# Patient Record
Sex: FEMALE | Race: ASIAN | NOT HISPANIC OR LATINO | URBAN - METROPOLITAN AREA
[De-identification: names, ages, dates, MRNs, and addresses within clinical notes are randomized per-mention and may not be internally consistent; named-entity substitution may affect disease eponyms.]

---

## 2019-06-01 ENCOUNTER — OUTPATIENT (OUTPATIENT)
Dept: OUTPATIENT SERVICES | Facility: HOSPITAL | Age: 69
LOS: 1 days | End: 2019-06-01
Payer: MEDICARE

## 2019-06-01 DIAGNOSIS — Z98.89 OTHER SPECIFIED POSTPROCEDURAL STATES: Chronic | ICD-10-CM

## 2019-06-01 PROCEDURE — G9001: CPT

## 2019-06-11 RX ORDER — CIPROFLOXACIN LACTATE 400MG/40ML
1 VIAL (ML) INTRAVENOUS
Qty: 0 | Refills: 0 | DISCHARGE
Start: 2019-06-11 | End: 2019-06-24

## 2019-06-11 RX ORDER — FLUCONAZOLE 150 MG/1
1 TABLET ORAL
Qty: 0 | Refills: 0 | DISCHARGE
Start: 2019-06-11 | End: 2019-06-24

## 2019-06-11 RX ORDER — LINEZOLID 600 MG/300ML
1 INJECTION, SOLUTION INTRAVENOUS
Qty: 0 | Refills: 0 | DISCHARGE
Start: 2019-06-11 | End: 2019-06-24

## 2019-06-15 ENCOUNTER — INPATIENT (INPATIENT)
Facility: HOSPITAL | Age: 69
LOS: 2 days | Discharge: PSYCHIATRIC OR SUBSTANCE USE FACILITY | End: 2019-06-18
Attending: INTERNAL MEDICINE | Admitting: INTERNAL MEDICINE
Payer: MEDICARE

## 2019-06-15 VITALS
TEMPERATURE: 97 F | RESPIRATION RATE: 18 BRPM | SYSTOLIC BLOOD PRESSURE: 170 MMHG | OXYGEN SATURATION: 100 % | DIASTOLIC BLOOD PRESSURE: 90 MMHG | HEART RATE: 98 BPM

## 2019-06-15 DIAGNOSIS — I10 ESSENTIAL (PRIMARY) HYPERTENSION: ICD-10-CM

## 2019-06-15 DIAGNOSIS — Z29.9 ENCOUNTER FOR PROPHYLACTIC MEASURES, UNSPECIFIED: ICD-10-CM

## 2019-06-15 DIAGNOSIS — M86.8X7 OTHER OSTEOMYELITIS, ANKLE AND FOOT: ICD-10-CM

## 2019-06-15 DIAGNOSIS — E16.2 HYPOGLYCEMIA, UNSPECIFIED: ICD-10-CM

## 2019-06-15 DIAGNOSIS — G93.41 METABOLIC ENCEPHALOPATHY: ICD-10-CM

## 2019-06-15 DIAGNOSIS — F20.9 SCHIZOPHRENIA, UNSPECIFIED: ICD-10-CM

## 2019-06-15 DIAGNOSIS — Z98.89 OTHER SPECIFIED POSTPROCEDURAL STATES: Chronic | ICD-10-CM

## 2019-06-15 DIAGNOSIS — D64.9 ANEMIA, UNSPECIFIED: ICD-10-CM

## 2019-06-15 LAB
ALBUMIN SERPL ELPH-MCNC: 3.5 G/DL — SIGNIFICANT CHANGE UP (ref 3.3–5)
ALP SERPL-CCNC: 86 U/L — SIGNIFICANT CHANGE UP (ref 40–120)
ALT FLD-CCNC: 9 U/L — SIGNIFICANT CHANGE UP (ref 4–33)
ANION GAP SERPL CALC-SCNC: 14 MMO/L — SIGNIFICANT CHANGE UP (ref 7–14)
APTT BLD: 37.6 SEC — HIGH (ref 27.5–36.3)
AST SERPL-CCNC: 17 U/L — SIGNIFICANT CHANGE UP (ref 4–32)
BASE EXCESS BLDV CALC-SCNC: 4.6 MMOL/L — SIGNIFICANT CHANGE UP
BASOPHILS # BLD AUTO: 0.02 K/UL — SIGNIFICANT CHANGE UP (ref 0–0.2)
BASOPHILS NFR BLD AUTO: 0.2 % — SIGNIFICANT CHANGE UP (ref 0–2)
BILIRUB SERPL-MCNC: 0.3 MG/DL — SIGNIFICANT CHANGE UP (ref 0.2–1.2)
BLOOD GAS VENOUS - CREATININE: 0.57 MG/DL — SIGNIFICANT CHANGE UP (ref 0.5–1.3)
BLOOD GAS VENOUS - FIO2: 21 — SIGNIFICANT CHANGE UP
BUN SERPL-MCNC: 8 MG/DL — SIGNIFICANT CHANGE UP (ref 7–23)
CALCIUM SERPL-MCNC: 9.6 MG/DL — SIGNIFICANT CHANGE UP (ref 8.4–10.5)
CHLORIDE BLDV-SCNC: 102 MMOL/L — SIGNIFICANT CHANGE UP (ref 96–108)
CHLORIDE SERPL-SCNC: 97 MMOL/L — LOW (ref 98–107)
CO2 SERPL-SCNC: 27 MMOL/L — SIGNIFICANT CHANGE UP (ref 22–31)
CREAT SERPL-MCNC: 0.52 MG/DL — SIGNIFICANT CHANGE UP (ref 0.5–1.3)
EOSINOPHIL # BLD AUTO: 0 K/UL — SIGNIFICANT CHANGE UP (ref 0–0.5)
EOSINOPHIL NFR BLD AUTO: 0 % — SIGNIFICANT CHANGE UP (ref 0–6)
GAS PNL BLDV: 139 MMOL/L — SIGNIFICANT CHANGE UP (ref 136–146)
GLUCOSE BLDV-MCNC: 80 MG/DL — SIGNIFICANT CHANGE UP (ref 70–99)
GLUCOSE SERPL-MCNC: 80 MG/DL — SIGNIFICANT CHANGE UP (ref 70–99)
HCO3 BLDV-SCNC: 27 MMOL/L — SIGNIFICANT CHANGE UP (ref 20–27)
HCT VFR BLD CALC: 30.6 % — LOW (ref 34.5–45)
HCT VFR BLDV CALC: 29.6 % — LOW (ref 34.5–45)
HGB BLD-MCNC: 9.1 G/DL — LOW (ref 11.5–15.5)
HGB BLDV-MCNC: 9.6 G/DL — LOW (ref 11.5–15.5)
IMM GRANULOCYTES NFR BLD AUTO: 0.2 % — SIGNIFICANT CHANGE UP (ref 0–1.5)
INR BLD: 1.27 — HIGH (ref 0.88–1.17)
LACTATE BLDV-MCNC: 1.4 MMOL/L — SIGNIFICANT CHANGE UP (ref 0.5–2)
LYMPHOCYTES # BLD AUTO: 1.06 K/UL — SIGNIFICANT CHANGE UP (ref 1–3.3)
LYMPHOCYTES # BLD AUTO: 11.6 % — LOW (ref 13–44)
MCHC RBC-ENTMCNC: 26.7 PG — LOW (ref 27–34)
MCHC RBC-ENTMCNC: 29.7 % — LOW (ref 32–36)
MCV RBC AUTO: 89.7 FL — SIGNIFICANT CHANGE UP (ref 80–100)
MONOCYTES # BLD AUTO: 0.24 K/UL — SIGNIFICANT CHANGE UP (ref 0–0.9)
MONOCYTES NFR BLD AUTO: 2.6 % — SIGNIFICANT CHANGE UP (ref 2–14)
NEUTROPHILS # BLD AUTO: 7.83 K/UL — HIGH (ref 1.8–7.4)
NEUTROPHILS NFR BLD AUTO: 85.4 % — HIGH (ref 43–77)
NRBC # FLD: 0 K/UL — SIGNIFICANT CHANGE UP (ref 0–0)
PCO2 BLDV: 59 MMHG — HIGH (ref 41–51)
PH BLDV: 7.33 PH — SIGNIFICANT CHANGE UP (ref 7.32–7.43)
PLATELET # BLD AUTO: 594 K/UL — HIGH (ref 150–400)
PMV BLD: 8.5 FL — SIGNIFICANT CHANGE UP (ref 7–13)
PO2 BLDV: 19 MMHG — LOW (ref 35–40)
POTASSIUM BLDV-SCNC: 3.7 MMOL/L — SIGNIFICANT CHANGE UP (ref 3.4–4.5)
POTASSIUM SERPL-MCNC: 3.7 MMOL/L — SIGNIFICANT CHANGE UP (ref 3.5–5.3)
POTASSIUM SERPL-SCNC: 3.7 MMOL/L — SIGNIFICANT CHANGE UP (ref 3.5–5.3)
PROT SERPL-MCNC: 8.6 G/DL — HIGH (ref 6–8.3)
PROTHROM AB SERPL-ACNC: 14.6 SEC — HIGH (ref 9.8–13.1)
RBC # BLD: 3.41 M/UL — LOW (ref 3.8–5.2)
RBC # FLD: 19.1 % — HIGH (ref 10.3–14.5)
SAO2 % BLDV: 19.1 % — LOW (ref 60–85)
SODIUM SERPL-SCNC: 138 MMOL/L — SIGNIFICANT CHANGE UP (ref 135–145)
TSH SERPL-MCNC: 3.44 UIU/ML — SIGNIFICANT CHANGE UP (ref 0.27–4.2)
WBC # BLD: 9.17 K/UL — SIGNIFICANT CHANGE UP (ref 3.8–10.5)
WBC # FLD AUTO: 9.17 K/UL — SIGNIFICANT CHANGE UP (ref 3.8–10.5)

## 2019-06-15 PROCEDURE — 71045 X-RAY EXAM CHEST 1 VIEW: CPT | Mod: 26

## 2019-06-15 PROCEDURE — 70450 CT HEAD/BRAIN W/O DYE: CPT | Mod: 26

## 2019-06-15 PROCEDURE — 93010 ELECTROCARDIOGRAM REPORT: CPT

## 2019-06-15 PROCEDURE — 99223 1ST HOSP IP/OBS HIGH 75: CPT | Mod: GC

## 2019-06-15 RX ORDER — LINEZOLID 600 MG/300ML
600 INJECTION, SOLUTION INTRAVENOUS EVERY 12 HOURS
Refills: 0 | Status: DISCONTINUED | OUTPATIENT
Start: 2019-06-15 | End: 2019-06-18

## 2019-06-15 RX ORDER — ENOXAPARIN SODIUM 100 MG/ML
40 INJECTION SUBCUTANEOUS DAILY
Refills: 0 | Status: DISCONTINUED | OUTPATIENT
Start: 2019-06-15 | End: 2019-06-18

## 2019-06-15 RX ORDER — HALOPERIDOL DECANOATE 100 MG/ML
5 INJECTION INTRAMUSCULAR AT BEDTIME
Refills: 0 | Status: DISCONTINUED | OUTPATIENT
Start: 2019-06-15 | End: 2019-06-17

## 2019-06-15 RX ORDER — AMLODIPINE BESYLATE 2.5 MG/1
5 TABLET ORAL DAILY
Refills: 0 | Status: DISCONTINUED | OUTPATIENT
Start: 2019-06-15 | End: 2019-06-18

## 2019-06-15 RX ORDER — FLUCONAZOLE 150 MG/1
200 TABLET ORAL DAILY
Refills: 0 | Status: DISCONTINUED | OUTPATIENT
Start: 2019-06-15 | End: 2019-06-18

## 2019-06-15 RX ORDER — LINEZOLID 600 MG/300ML
600 INJECTION, SOLUTION INTRAVENOUS EVERY 12 HOURS
Refills: 0 | Status: DISCONTINUED | OUTPATIENT
Start: 2019-06-15 | End: 2019-06-15

## 2019-06-15 RX ORDER — METOPROLOL TARTRATE 50 MG
50 TABLET ORAL
Refills: 0 | Status: DISCONTINUED | OUTPATIENT
Start: 2019-06-15 | End: 2019-06-18

## 2019-06-15 RX ORDER — ASPIRIN/CALCIUM CARB/MAGNESIUM 324 MG
81 TABLET ORAL DAILY
Refills: 0 | Status: DISCONTINUED | OUTPATIENT
Start: 2019-06-15 | End: 2019-06-18

## 2019-06-15 RX ORDER — BENZTROPINE MESYLATE 1 MG
0.5 TABLET ORAL AT BEDTIME
Refills: 0 | Status: DISCONTINUED | OUTPATIENT
Start: 2019-06-15 | End: 2019-06-18

## 2019-06-15 RX ORDER — LISINOPRIL 2.5 MG/1
30 TABLET ORAL DAILY
Refills: 0 | Status: DISCONTINUED | OUTPATIENT
Start: 2019-06-15 | End: 2019-06-18

## 2019-06-15 RX ORDER — SODIUM CHLORIDE 9 MG/ML
1000 INJECTION, SOLUTION INTRAVENOUS
Refills: 0 | Status: DISCONTINUED | OUTPATIENT
Start: 2019-06-15 | End: 2019-06-16

## 2019-06-15 RX ORDER — FLUCONAZOLE 150 MG/1
200 TABLET ORAL DAILY
Refills: 0 | Status: DISCONTINUED | OUTPATIENT
Start: 2019-06-15 | End: 2019-06-15

## 2019-06-15 RX ORDER — DEXTROSE 50 % IN WATER 50 %
50 SYRINGE (ML) INTRAVENOUS ONCE
Refills: 0 | Status: COMPLETED | OUTPATIENT
Start: 2019-06-15 | End: 2019-06-15

## 2019-06-15 RX ADMIN — FLUCONAZOLE 200 MILLIGRAM(S): 150 TABLET ORAL at 23:47

## 2019-06-15 RX ADMIN — HALOPERIDOL DECANOATE 5 MILLIGRAM(S): 100 INJECTION INTRAMUSCULAR at 23:08

## 2019-06-15 RX ADMIN — Medication 0.5 MILLIGRAM(S): at 23:08

## 2019-06-15 RX ADMIN — SODIUM CHLORIDE 20 MILLILITER(S): 9 INJECTION, SOLUTION INTRAVENOUS at 16:37

## 2019-06-15 RX ADMIN — Medication 50 MILLILITER(S): at 16:57

## 2019-06-15 NOTE — H&P ADULT - PROBLEM SELECTOR PLAN 1
- 2/2 hypoglycemia  - likely 2/2 decreased PO intake, insulin administration  - previous episode in setting of sulfonylurea and osteomyelitis   - per daughter, no longer on sulfonylurea  - last episode likely in setting of infection, currently afebrile, no leukocytosis and no complaints of infectious sources, right foot stump healing  - c/w antibiotics per d/c summary  - c/w FS q2 and D10 drip given drop again in ED after amp from EMS  - c/w PO intake  - will need optimization of DM regimen prior to d/c, will need to clarify insulin administration with other daughter who gives to patient - decrease vs d/c insulin  - f/u A1C  - CTH with chronic changes, no acute pathology  - if recurrent hypoglycemic episodes despite glucose therapy will consider recurrent sulfonylurea intox and give octreotide

## 2019-06-15 NOTE — ED PROVIDER NOTE - ATTENDING CONTRIBUTION TO CARE
I, Jennifer Cabot, MD, have performed a history and physical exam of the patient and discussed their management with the resident. I reviewed the resident's note and agree with the documented findings and plan of care. My medical decision making and observations are found above.    Cabot: 69F with PMH of IDDM not on sulfonylureas, recent distal foot amp, HTN who comes in with AMS (overly happy, verbose, AAOx1 only) and hypoglycemia to 27.  Family denies F/C/N/V/D/urinary sx/cough/HA/erythema or discharge from the wound.  HDS, NAD, MMM, eyes clear, lungs CTAB, heart sounds normal, abd soft, NT, ND, no CVAT, LEs without edema, wwp, skin normal temperature and color, neuro: AAOx1, verbose, PERRLA, EOMIB, CN 2-12 intact, 5/5 strength, SILT.  Will give food, check basic labs, UA, cultures, CXR, head CT, reassess. I, Jennifer Cabot, MD, have performed a history and physical exam of the patient and discussed their management with the resident. I reviewed the resident's note and agree with the documented findings and plan of care. My medical decision making and observations are found above.    Cabot: 69F with PMH of IDDM not on sulfonylureas, recent distal foot amp, HTN who comes in with AMS (overly happy, verbose, AAOx1 only) and hypoglycemia to 27.  Family denies F/C/N/V/D/urinary sx/cough/HA/erythema or discharge from the wound.  HDS, NAD, MMM, eyes clear, lungs CTAB, heart sounds normal, abd soft, NT, ND, no CVAT, LEs without edema, R amputation site with sutures intact, no erythema, warmth, or discharge, wwp, skin normal temperature and color, neuro: AAOx1, verbose, PERRLA, EOMIB, CN 2-12 intact, 5/5 strength, SILT.  Will give food, check basic labs, UA, cultures, CXR, head CT, reassess. 5

## 2019-06-15 NOTE — H&P ADULT - ATTENDING COMMENTS
70 yo f currently on metformin and insulin, recently discontinued sulfonylurea, presenting with symptomatic hypoglycemia likely stemming from decreased insulin requirement/decreased po intake as pt diagnosed  with recent osteomyelitis in right foot with ulcer, and underwent transmetatarsal amputation. No current signs of sepsis, pt with normal renal function. c/w d10 until FS reflects resolution of hypoglycemic insult. c/w OM abx, wound care

## 2019-06-15 NOTE — H&P ADULT - NSICDXPASTMEDICALHX_GEN_ALL_CORE_FT
PAST MEDICAL HISTORY:  Diabetes     Hyperlipidemia     Hypertension PAST MEDICAL HISTORY:  Diabetes insulin dependent    Hypertension     Schizophrenia

## 2019-06-15 NOTE — ED ADULT TRIAGE NOTE - CHIEF COMPLAINT QUOTE
pt brought in by ems family states pt did not wake up so checked sugar and it was 24  called ems / on arrival pt was responsive to painful stimuli/  pt given 1 amp d50 fs by ems 257    fs at trige  86   pt has hx schizophrneia/ pt arrives a&o  c/o abd pain at this time/  pt had procedure done on foot about 1 week ago   iv in left arm by ems

## 2019-06-15 NOTE — H&P ADULT - PROBLEM SELECTOR PLAN 4
- unknown baseline  - last labs in system from 2015 with H/H 13.5/40.2  - TSH wnl, no s/s of bleeding  - will send basic anemia w/u - iron panel, folate, b12, retic, ferritin - unknown baseline, per d/c summary was noted at [facility] as well  - last labs in system from 2015 with H/H 13.5/40.2  - TSH wnl, no s/s of bleeding  - will send basic anemia w/u - iron panel, folate, b12, retic, ferritin

## 2019-06-15 NOTE — H&P ADULT - PROBLEM SELECTOR PLAN 7
- diet - regular  - dvt - lovenox - dx at [facility] last week s/p right transmetatarsal amputation  - c/w linezolid, fluconazole, levaquin - d/c with 14 day course to end 6/24  - hypoglycemia not a SE
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
## 2019-06-15 NOTE — H&P ADULT - NSHPPHYSICALEXAM_GEN_ALL_CORE
Vital Signs Last 24 Hrs  T(C): 36.7 (15 Negro 2019 16:47), Max: 36.7 (15 Negro 2019 16:47)  T(F): 98 (15 Negro 2019 16:47), Max: 98 (15 Negro 2019 16:47)  HR: 108 (15 Negro 2019 16:47) (98 - 108)  BP: 135/72 (15 Negro 2019 16:47) (103/76 - 170/90)  BP(mean): --  RR: 18 (15 Negro 2019 16:47) (16 - 18)  SpO2: 100% (15 Negro 2019 16:47) (100% - 100%) Vital Signs Last 24 Hrs  T(C): 36.7 (15 Negro 2019 16:47), Max: 36.7 (15 Negro 2019 16:47)  T(F): 98 (15 Negro 2019 16:47), Max: 98 (15 Negro 2019 16:47)  HR: 108 (15 Negro 2019 16:47) (98 - 108)  BP: 135/72 (15 Negro 2019 16:47) (103/76 - 170/90)  BP(mean): --  RR: 18 (15 Negro 2019 16:47) (16 - 18)  SpO2: 100% (15 Negro 2019 16:47) (100% - 100%)    GENERAL: NAD, well-developed  HEAD:  Atraumatic, Normocephalic  EYES: EOMI, PERRLA, conjunctiva and sclera clear  NECK: Supple, No JVD  CHEST/LUNG: Clear to auscultation bilaterally; No wheezes or rales  HEART: Regular rate and rhythm; No murmurs, rubs, or gallops  ABDOMEN: Soft, Nontender, Nondistended; Bowel sounds present  EXTREMITIES:  2+ Peripheral Pulses, No clubbing, cyanosis, or edema  PSYCH: AAOx3  NEUROLOGY: non-focal  SKIN: No rashes or lesions Vital Signs Last 24 Hrs  T(C): 36.7 (15 Negro 2019 16:47), Max: 36.7 (15 Negro 2019 16:47)  T(F): 98 (15 Negro 2019 16:47), Max: 98 (15 Negro 2019 16:47)  HR: 108 (15 Negro 2019 16:47) (98 - 108)  BP: 135/72 (15 Negro 2019 16:47) (103/76 - 170/90)  BP(mean): --  RR: 18 (15 Negro 2019 16:47) (16 - 18)  SpO2: 100% (15 Negro 2019 16:47) (100% - 100%)    GENERAL: NAD, sitting up in bed, consistently talking with tangents  HEAD:  Atraumatic, Normocephalic, poor dentition with missing teeth  EYES: EOMI, PERRLA, conjunctiva and sclera clear  NECK: Supple  CHEST/LUNG: Clear to auscultation bilaterally; No wheezes or rales  HEART: tachycardic, regular rhythm; No murmurs, rubs, or gallops  ABDOMEN: Soft, Nontender, Nondistended; Bowel sounds present  EXTREMITIES:  transmetatarsal amputation on right foot with stitching in place, dry skin, no active bleeding or pus  PSYCH: AAOx3, persistent talking as above however able to answer questions  NEUROLOGY: non-focal

## 2019-06-15 NOTE — H&P ADULT - HISTORY OF PRESENT ILLNESS
69F with DM2 c/b neuropathy with recent transmetatarsal amputation, HTN coming to the ED with AMS at home. 69F with DM2 c/b neuropathy with recent transmetatarsal amputation, schizophrenia, HTN coming to the ED with AMS at home. History largely obtained from daughter at bedside due to patient's underlying schizophrenia. Daughter states patient was in normal state of health yesterday, ate dinner and went to bed. She takes haldol at night per daughter which sometimes will make her more sleepy but usually is tolerated. In the AM she did not wake up at her usual time which family attributed to haldol at first. She continued to sleep and later in the morning family tried to wake her but she was unarousable so they checked her sugar which was 24. They called 911 and patient was given dextrose which resulted in drastic improvement in mental status. Patient was recently hospitalized in [facility] for hypoglycemia and found to have osteomyelitis in right foot with ulcer. Underwent transmetatarsal amputation on 6/6 and was d/c on linezolid, flagyl and levaquin. She was previously on a sulfonylurea which was d/albertina on admission. She was d/c on Monday 6/10 and has been normal sense however has had decreased PO intake since admission. Daughter at bedside states she is now back to baseline.     In ED, noted to have FS to 70-80s however dropped again to 50s - given dextrose 50 and started on D10 drip. 69F with DM2 c/b neuropathy with recent transmetatarsal amputation, schizophrenia, HTN coming to the ED with AMS at home. History largely obtained from daughter at bedside due to patient's underlying schizophrenia. Daughter states patient was in normal state of health yesterday, ate dinner and went to bed. She takes haldol at night per daughter which sometimes will make her more sleepy but usually is tolerated. In the AM she did not wake up at her usual time which family attributed to haldol at first. She continued to sleep and later in the morning family tried to wake her but she was unarousable so they checked her sugar which was 24. They called 911 and patient was given dextrose which resulted in drastic improvement in mental status. Patient was recently hospitalized in [facility] for hypoglycemia and found to have osteomyelitis in right foot with ulcer. Underwent transmetatarsal amputation on 6/6 and was d/c on linezolid, fluconazole and levaquin. She was previously on a sulfonylurea which was d/albertina on admission. She was d/c on Monday 6/10 and has been normal sense however has had decreased PO intake since admission. Daughter at bedside states she is now back to baseline.     In ED, noted to have FS to 70-80s however dropped again to 50s - given dextrose 50 and started on D10 drip.

## 2019-06-15 NOTE — H&P ADULT - PROBLEM SELECTOR PLAN 6
- dx at [facility] last week s/p right transmetatarsal amputation  - c/w linezolid, flagyl, levaquin - d/c with 14 day course  - hypoglycemia not a SE - dx at [facility] last week s/p right transmetatarsal amputation  - c/w linezolid, fluconazole, levaquin - d/c with 14 day course to end 6/24  - hypoglycemia not a SE - on haldol and benztropine at home  - per d/c paperwork had prolonged QT  - will check EKG and restart home meds

## 2019-06-15 NOTE — H&P ADULT - NSHPLABSRESULTS_GEN_ALL_CORE
LABS:                        9.1    9.17  )-----------( 594      ( 15 Negro 2019 13:00 )             30.6     15 Negro 2019 13:00    138    |  97     |  8      ----------------------------<  80     3.7     |  27     |  0.52     Ca    9.6        15 Negro 2019 13:00    TPro  8.6    /  Alb  3.5    /  TBili  0.3    /  DBili  x      /  AST  17     /  ALT  9      /  AlkPhos  86     15 Negro 2019 13:00    PT/INR - ( 15 Negro 2019 13:21 )   PT: 14.6 SEC;   INR: 1.27          PTT - ( 15 Negro 2019 13:21 )  PTT:37.6 SEC    < from: CT Head No Cont (06.15.19 @ 15:20) >    IMPRESSION:     No acute intracranial hemorrhage, brain edema, or mass effect.    < end of copied text >

## 2019-06-15 NOTE — H&P ADULT - ASSESSMENT
69F with DM2 c/b neuropathy with recent transmetatarsal amputation, HTN presenting with metabolic encephalopathy 2/2 hypoglycemia.

## 2019-06-15 NOTE — ED PROVIDER NOTE - CLINICAL SUMMARY MEDICAL DECISION MAKING FREE TEXT BOX
Cabot: 69F with PMH of IDDM not on sulfonylureas, recent distal foot amp, HTN who comes in with AMS (overly happy, verbose, AAOx1 only) and hypoglycemia to 27.  Family denies F/C/N/V/D/urinary sx/cough/HA/erythema or discharge from the wound.  HDS, NAD, MMM, eyes clear, lungs CTAB, heart sounds normal, abd soft, NT, ND, no CVAT, LEs without edema, wwp, skin normal temperature and color, neuro: AAOx1, verbose, PERRLA, EOMIB, CN 2-12 intact, 5/5 strength, SILT.  Will give food, check basic labs, UA, cultures, CXR, head CT, reassess.

## 2019-06-15 NOTE — H&P ADULT - NSHPREVIEWOFSYSTEMS_GEN_ALL_CORE
CONSTITUTIONAL:  No weight loss, fever, chills, weakness or fatigue.  HEENT:  Eyes:  No visual loss, blurred vision, double vision or yellow sclerae.   Ears, Nose, Throat:  No sneezing, congestion, runny nose or dysphagia.  SKIN:  No rash or itching.  CARDIOVASCULAR:  No chest pain, chest pressure or chest discomfort. No palpitations or edema.  RESPIRATORY:  No shortness of breath, cough or sputum.  GASTROINTESTINAL:  No anorexia, nausea, vomiting or diarrhea. No abdominal pain or blood.  GENITOURINARY:  No hematuria, dysuria.   NEUROLOGICAL:  No headache, dizziness, syncope, numbness or tingling in the extremities. No change in bowel or bladder control.  MUSCULOSKELETAL:  No muscle, back pain, joint pain or stiffness.  HEMATOLOGIC:  No anemia, bleeding or bruising.  PSYCHIATRIC:  No history of depression or anxiety.  ENDOCRINOLOGIC:  No reports of sweating, cold or heat intolerance. No polyuria or polydipsia. Limited due to underlying schizophrenia    CONSTITUTIONAL:  No fever, chills  HEENT:  Eyes:  No vision changes  Ears, Nose, Throat:  No sneezing, congestion  CARDIOVASCULAR:  No chest pain  RESPIRATORY:  No shortness of breath, cough  GASTROINTESTINAL:  No vomiting, abdominal pain, diarrhea  GENITOURINARY:  No dysuria.   NEUROLOGICAL:  No headache  MUSCULOSKELETAL:  chronic left sided back pain

## 2019-06-15 NOTE — H&P ADULT - PROBLEM SELECTOR PROBLEM 5
Schizophrenia Type 2 diabetes mellitus with other circulatory complication, with long-term current use of insulin

## 2019-06-15 NOTE — ED ADULT NURSE NOTE - OBJECTIVE STATEMENT
patient brought in by daughter stating she was not waking up and her blood sugar was low at home. patient alert and speaking continuously ox1 (Name). as per daughter this is abnormal. patient is usually ox3, denies any pain. h/o s/p partial amputation of right foot 1 week ago. stump with sutures noted to right foot. sutures are intact and dry. no discharge or redness noted. labs done as ordered. awaiting results and re eval.

## 2019-06-15 NOTE — ED PROVIDER NOTE - OBJECTIVE STATEMENT
Cabot: 69F with PMH of IDDM not on sulfonylureas, recent distal foot amp, HTN who comes in with AMS (overly happy, verbose, AAOx1 only) and hypoglycemia to 27.  Family denies F/C/N/V/D/urinary sx/cough/HA/erythema or discharge from the wound.

## 2019-06-15 NOTE — ED PROVIDER NOTE - PHYSICAL EXAMINATION
HDS, NAD, MMM, eyes clear, lungs CTAB, heart sounds normal, abd soft, NT, ND, no CVAT, LEs without edema, wwp, skin normal temperature and color, neuro: AAOx1, verbose, PERRLA, EOMIB, CN 2-12 intact, 5/5 strength, SILT HDS, NAD, MMM, eyes clear, lungs CTAB, heart sounds normal, abd soft, NT, ND, no CVAT, LEs without edema, R amputation site with sutures intact, no erythema, warmth, or discharge, wwp, skin normal temperature and color, neuro: AAOx1, verbose, PERRLA, EOMIB, CN 2-12 intact, 5/5 strength, SILT

## 2019-06-15 NOTE — H&P ADULT - PROBLEM SELECTOR PLAN 5
- on haldol and benztropine at home  - per d/c paperwork had prolonged QT  - will check EKG and restart home meds - previously on sulfonylurea, insulin, metformin with hypoglycemic episode  - sulfonylurea d/albertina  - hypoglycemia management as above  - will hold all insulin for now until able to wean off D10 drip   - FS q2 for now - if remains stable on next check will extend to q4

## 2019-06-16 DIAGNOSIS — E11.59 TYPE 2 DIABETES MELLITUS WITH OTHER CIRCULATORY COMPLICATIONS: ICD-10-CM

## 2019-06-16 LAB
ANION GAP SERPL CALC-SCNC: 14 MMO/L — SIGNIFICANT CHANGE UP (ref 7–14)
APPEARANCE UR: SIGNIFICANT CHANGE UP
BACTERIA # UR AUTO: SIGNIFICANT CHANGE UP
BILIRUB UR-MCNC: NEGATIVE — SIGNIFICANT CHANGE UP
BLOOD UR QL VISUAL: NEGATIVE — SIGNIFICANT CHANGE UP
BUN SERPL-MCNC: 6 MG/DL — LOW (ref 7–23)
CA CARBONATE CRY # UR COMP ASSIST: SIGNIFICANT CHANGE UP (ref 0–0)
CALCIUM SERPL-MCNC: 9.4 MG/DL — SIGNIFICANT CHANGE UP (ref 8.4–10.5)
CHLORIDE SERPL-SCNC: 93 MMOL/L — LOW (ref 98–107)
CO2 SERPL-SCNC: 26 MMOL/L — SIGNIFICANT CHANGE UP (ref 22–31)
COLOR SPEC: COLORLESS — SIGNIFICANT CHANGE UP
CREAT SERPL-MCNC: 0.65 MG/DL — SIGNIFICANT CHANGE UP (ref 0.5–1.3)
FERRITIN SERPL-MCNC: 209.1 NG/ML — HIGH (ref 15–150)
FOLATE SERPL-MCNC: 16.8 NG/ML — SIGNIFICANT CHANGE UP (ref 4.7–20)
GLUCOSE SERPL-MCNC: 189 MG/DL — HIGH (ref 70–99)
GLUCOSE UR-MCNC: NEGATIVE — SIGNIFICANT CHANGE UP
HBA1C BLD-MCNC: 7.9 % — HIGH (ref 4–5.6)
HCT VFR BLD CALC: 29.7 % — LOW (ref 34.5–45)
HCV AB S/CO SERPL IA: 0.18 S/CO — SIGNIFICANT CHANGE UP (ref 0–0.99)
HCV AB SERPL-IMP: SIGNIFICANT CHANGE UP
HGB BLD-MCNC: 9 G/DL — LOW (ref 11.5–15.5)
IRON SATN MFR SERPL: 148 UG/DL — SIGNIFICANT CHANGE UP (ref 30–160)
IRON SATN MFR SERPL: 203 UG/DL — SIGNIFICANT CHANGE UP (ref 140–530)
KETONES UR-MCNC: NEGATIVE — SIGNIFICANT CHANGE UP
LEUKOCYTE ESTERASE UR-ACNC: NEGATIVE — SIGNIFICANT CHANGE UP
MAGNESIUM SERPL-MCNC: 1.8 MG/DL — SIGNIFICANT CHANGE UP (ref 1.6–2.6)
MCHC RBC-ENTMCNC: 27 PG — SIGNIFICANT CHANGE UP (ref 27–34)
MCHC RBC-ENTMCNC: 30.3 % — LOW (ref 32–36)
MCV RBC AUTO: 89.2 FL — SIGNIFICANT CHANGE UP (ref 80–100)
MUCOUS THREADS # UR AUTO: SIGNIFICANT CHANGE UP
NITRITE UR-MCNC: NEGATIVE — SIGNIFICANT CHANGE UP
NRBC # FLD: 0 K/UL — SIGNIFICANT CHANGE UP (ref 0–0)
PH UR: 7.5 — SIGNIFICANT CHANGE UP (ref 5–8)
PHOSPHATE SERPL-MCNC: 3.5 MG/DL — SIGNIFICANT CHANGE UP (ref 2.5–4.5)
PLATELET # BLD AUTO: 529 K/UL — HIGH (ref 150–400)
PMV BLD: 8.9 FL — SIGNIFICANT CHANGE UP (ref 7–13)
POTASSIUM SERPL-MCNC: 4.7 MMOL/L — SIGNIFICANT CHANGE UP (ref 3.5–5.3)
POTASSIUM SERPL-SCNC: 4.7 MMOL/L — SIGNIFICANT CHANGE UP (ref 3.5–5.3)
PROT UR-MCNC: 100 — HIGH
RBC # BLD: 3.33 M/UL — LOW (ref 3.8–5.2)
RBC # FLD: 19.1 % — HIGH (ref 10.3–14.5)
RBC CASTS # UR COMP ASSIST: SIGNIFICANT CHANGE UP (ref 0–?)
RETICS #: 94 K/UL — SIGNIFICANT CHANGE UP (ref 25–125)
RETICS/RBC NFR: 2.8 % — HIGH (ref 0.5–2.5)
SODIUM SERPL-SCNC: 133 MMOL/L — LOW (ref 135–145)
SP GR SPEC: 1.02 — SIGNIFICANT CHANGE UP (ref 1–1.04)
SPECIMEN SOURCE: SIGNIFICANT CHANGE UP
SPECIMEN SOURCE: SIGNIFICANT CHANGE UP
SQUAMOUS # UR AUTO: SIGNIFICANT CHANGE UP
UIBC SERPL-MCNC: 54.7 UG/DL — LOW (ref 110–370)
UROBILINOGEN FLD QL: NORMAL — SIGNIFICANT CHANGE UP
VIT B12 SERPL-MCNC: 456 PG/ML — SIGNIFICANT CHANGE UP (ref 200–900)
WBC # BLD: 6.28 K/UL — SIGNIFICANT CHANGE UP (ref 3.8–10.5)
WBC # FLD AUTO: 6.28 K/UL — SIGNIFICANT CHANGE UP (ref 3.8–10.5)
WBC UR QL: SIGNIFICANT CHANGE UP (ref 0–?)

## 2019-06-16 PROCEDURE — 99233 SBSQ HOSP IP/OBS HIGH 50: CPT | Mod: GC

## 2019-06-16 RX ORDER — DEXTROSE 50 % IN WATER 50 %
25 SYRINGE (ML) INTRAVENOUS ONCE
Refills: 0 | Status: DISCONTINUED | OUTPATIENT
Start: 2019-06-16 | End: 2019-06-18

## 2019-06-16 RX ORDER — SODIUM CHLORIDE 9 MG/ML
1000 INJECTION, SOLUTION INTRAVENOUS
Refills: 0 | Status: DISCONTINUED | OUTPATIENT
Start: 2019-06-16 | End: 2019-06-18

## 2019-06-16 RX ORDER — GLUCAGON INJECTION, SOLUTION 0.5 MG/.1ML
1 INJECTION, SOLUTION SUBCUTANEOUS ONCE
Refills: 0 | Status: DISCONTINUED | OUTPATIENT
Start: 2019-06-16 | End: 2019-06-18

## 2019-06-16 RX ORDER — DEXTROSE 50 % IN WATER 50 %
12.5 SYRINGE (ML) INTRAVENOUS ONCE
Refills: 0 | Status: DISCONTINUED | OUTPATIENT
Start: 2019-06-16 | End: 2019-06-18

## 2019-06-16 RX ORDER — DEXTROSE 50 % IN WATER 50 %
15 SYRINGE (ML) INTRAVENOUS ONCE
Refills: 0 | Status: DISCONTINUED | OUTPATIENT
Start: 2019-06-16 | End: 2019-06-18

## 2019-06-16 RX ORDER — INSULIN LISPRO 100/ML
VIAL (ML) SUBCUTANEOUS
Refills: 0 | Status: DISCONTINUED | OUTPATIENT
Start: 2019-06-16 | End: 2019-06-18

## 2019-06-16 RX ORDER — INSULIN LISPRO 100/ML
VIAL (ML) SUBCUTANEOUS AT BEDTIME
Refills: 0 | Status: DISCONTINUED | OUTPATIENT
Start: 2019-06-16 | End: 2019-06-18

## 2019-06-16 RX ORDER — INSULIN GLARGINE 100 [IU]/ML
11 INJECTION, SOLUTION SUBCUTANEOUS AT BEDTIME
Refills: 0 | Status: DISCONTINUED | OUTPATIENT
Start: 2019-06-16 | End: 2019-06-18

## 2019-06-16 RX ADMIN — AMLODIPINE BESYLATE 5 MILLIGRAM(S): 2.5 TABLET ORAL at 06:05

## 2019-06-16 RX ADMIN — INSULIN GLARGINE 11 UNIT(S): 100 INJECTION, SOLUTION SUBCUTANEOUS at 22:09

## 2019-06-16 RX ADMIN — HALOPERIDOL DECANOATE 5 MILLIGRAM(S): 100 INJECTION INTRAMUSCULAR at 22:09

## 2019-06-16 RX ADMIN — Medication 1: at 18:03

## 2019-06-16 RX ADMIN — Medication 81 MILLIGRAM(S): at 12:11

## 2019-06-16 RX ADMIN — Medication 0.5 MILLIGRAM(S): at 22:09

## 2019-06-16 RX ADMIN — Medication 50 MILLIGRAM(S): at 06:05

## 2019-06-16 RX ADMIN — Medication 2: at 22:09

## 2019-06-16 RX ADMIN — LINEZOLID 600 MILLIGRAM(S): 600 INJECTION, SOLUTION INTRAVENOUS at 06:05

## 2019-06-16 RX ADMIN — FLUCONAZOLE 200 MILLIGRAM(S): 150 TABLET ORAL at 12:11

## 2019-06-16 RX ADMIN — Medication 50 MILLIGRAM(S): at 18:01

## 2019-06-16 RX ADMIN — LINEZOLID 600 MILLIGRAM(S): 600 INJECTION, SOLUTION INTRAVENOUS at 18:01

## 2019-06-16 RX ADMIN — SODIUM CHLORIDE 20 MILLILITER(S): 9 INJECTION, SOLUTION INTRAVENOUS at 00:05

## 2019-06-16 RX ADMIN — LISINOPRIL 30 MILLIGRAM(S): 2.5 TABLET ORAL at 06:05

## 2019-06-16 RX ADMIN — ENOXAPARIN SODIUM 40 MILLIGRAM(S): 100 INJECTION SUBCUTANEOUS at 12:11

## 2019-06-16 NOTE — PROGRESS NOTE ADULT - PROBLEM SELECTOR PLAN 7
- dx at [facility] last week s/p right transmetatarsal amputation  - c/w linezolid, fluconazole, levaquin - d/c with 14 day course to end 6/24  - hypoglycemia not a SE

## 2019-06-16 NOTE — PROGRESS NOTE ADULT - PROBLEM SELECTOR PLAN 6
- on haldol and benztropine at home  - per d/c paperwork had prolonged QT  - will check EKG and restart home meds - on haldol and benztropine at home  - per d/c paperwork had prolonged QT  - repeat ekg 475, will c/w haldol and benztropine

## 2019-06-16 NOTE — PROGRESS NOTE ADULT - SUBJECTIVE AND OBJECTIVE BOX
=========================================  CONTACT INFO  Siddharth Stevens M.D., PGY-1  Pager: [facility]- [phone]    Mon-Fri: pager covered by day team 7am-7pm;   Sa/Sun: see chart, primary physician assigned available 7am-12pm  Sat/[place] Cross Coverage 12pm-7pm: [facility] - pager forwarded to covering Resident  For Night coverage 7pm-7am:  [facility]- page [phone] for Red, [phone] for Blue  =========================================    Patient is a 69y old  Female who presents with a chief complaint of encephalopathy (15 Negro 2019 19:11)      SUBJECTIVE / OVERNIGHT EVENTS:  Patient seen and examined at bedside. This morning, she is resting comfortably in bed and reports no new issues or overnight events.     She reports:  [  ] CP  [  ] SOB  [  ] Fever  [  ] N /  [  ] V  [  ] Diarrhea  [X] None of the above    Other Review of Systems Negative.    MEDICATIONS  (STANDING):  amLODIPine   Tablet 5 milliGRAM(s) Oral daily  aspirin enteric coated 81 milliGRAM(s) Oral daily  benztropine 0.5 milliGRAM(s) Oral at bedtime  enoxaparin Injectable 40 milliGRAM(s) SubCutaneous daily  fluconAZOLE   Tablet 200 milliGRAM(s) Oral daily  haloperidol     Tablet 5 milliGRAM(s) Oral at bedtime  levoFLOXacin  Tablet 750 milliGRAM(s) Oral every 24 hours  linezolid    Tablet 600 milliGRAM(s) Oral every 12 hours  lisinopril 30 milliGRAM(s) Oral daily  metoprolol tartrate 50 milliGRAM(s) Oral two times a day    MEDICATIONS  (PRN):      OBJECTIVE:    Vital Signs Last 24 Hrs  T(C): 36.8 (2019 06:02), Max: 37.1 (15 Negro 2019 21:33)  T(F): 98.2 (2019 06:02), Max: 98.8 (15 Negro 2019 21:33)  HR: 96 (2019 06:02) (96 - 108)  BP: 129/70 (2019 06:02) (103/76 - 170/90)  BP(mean): --  RR: 17 (2019 06:02) (16 - 18)  SpO2: 100% (2019 06:02) (100% - 100%)     CAPILLARY BLOOD GLUCOSE      POCT Blood Glucose.: 151 mg/dL (2019 09:18)  POCT Blood Glucose.: 185 mg/dL (2019 06:10)  POCT Blood Glucose.: 152 mg/dL (2019 03:52)  POCT Blood Glucose.: 205 mg/dL (2019 01:47)  POCT Blood Glucose.: 183 mg/dL (15 Negro 2019 23:52)  POCT Blood Glucose.: 121 mg/dL (15 Negro 2019 22:01)  POCT Blood Glucose.: 139 mg/dL (15 Negro 2019 21:12)  POCT Blood Glucose.: 132 mg/dL (15 Negro 2019 20:01)  POCT Blood Glucose.: 180 mg/dL (15 Negro 2019 17:26)  POCT Blood Glucose.: 58 mg/dL (15 Negro 2019 16:40)  POCT Blood Glucose.: 57 mg/dL (15 Negro 2019 16:08)  POCT Blood Glucose.: 152 mg/dL (15 Negro 2019 13:36)  POCT Blood Glucose.: 72 mg/dL (15 Negro 2019 13:14)  POCT Blood Glucose.: 86 mg/dL (15 Negro 2019 12:49)    I&O's Summary      PHYSICAL EXAM:  GENERAL: NAD, well-developed  HEAD:  Atraumatic, Normocephalic  EYES: EOMI, PERRLA, conjunctiva and sclera clear  NECK: Supple, No JVD  CHEST/LUNG: Clear to auscultation bilaterally; No wheeze  HEART: Regular rate and rhythm; No murmurs, rubs, or gallops  ABDOMEN: Soft, Nontender, Nondistended; Bowel sounds present  EXTREMITIES:  2+ Peripheral Pulses, No clubbing, cyanosis, or edema  PSYCH: AAOx3  NEUROLOGY: non-focal  SKIN: No rashes or lesions    LABS:                        9.0    6.28  )-----------( 529      ( 2019 06:00 )             29.7     Auto Eosinophil # x     / Auto Eosinophil % x     / Auto Neutrophil # x     / Auto Neutrophil % x     / BANDS % x                            9.1    9.17  )-----------( 594      ( 15 Negro 2019 13:00 )             30.6     Auto Eosinophil # 0.00  / Auto Eosinophil % 0.0   / Auto Neutrophil # 7.83  / Auto Neutrophil % 85.4  / BANDS % x        06-16    133<L>  |  93<L>  |  6<L>  ----------------------------<  189<H>  4.7   |  26  |  0.65  06-15    138  |  97<L>  |  8   ----------------------------<  80  3.7   |  27  |  0.52    Ca    9.4      2019 06:00  Mg     1.8     06-16  Phos  3.5     06-16  TPro  8.6<H>  /  Alb  3.5  /  TBili  0.3  /  DBili  x   /  AST  17  /  ALT  9   /  AlkPhos  86  06-15    PT/INR - ( 15 Negro 2019 13:21 )   PT: 14.6 SEC;   INR: 1.27          PTT - ( 15 Negro 2019 13:21 )  PTT:37.6 SEC      Urinalysis Basic - ( 2019 06:25 )    Color: COLORLESS / Appearance: TURBID / S.020 / pH: 7.5  Gluc: NEGATIVE / Ketone: NEGATIVE  / Bili: NEGATIVE / Urobili: NORMAL   Blood: NEGATIVE / Protein: 100 / Nitrite: NEGATIVE   Leuk Esterase: NEGATIVE / RBC: 0-2 / WBC 0-2   Sq Epi: MODERATE / Non Sq Epi: x / Bacteria: MOD            ABG:     VBG: ( 13:00 ) - VBG - pH: 7.33  | pCO2: 59    | pO2: 19    | Lactate: 1.4        Microbiology:        Care Discussed with Consultants/Other Providers:    RADIOLOGY & ADDITIONAL TESTS:  (Imaging Personally Reviewed) =========================================  CONTACT INFO  Siddharth Stevens M.D., PGY-1  Pager: [facility]- [phone]    Mon-Fri: pager covered by day team 7am-7pm;   Sa/Sun: see chart, primary physician assigned available 7am-12pm  Sat/[place] Cross Coverage 12pm-7pm: [facility] - pager forwarded to covering Resident  For Night coverage 7pm-7am:  [facility]- page [phone] for Red, [phone] for Blue  =========================================    Patient is a 69y old  Female who presents with a chief complaint of encephalopathy (15 Negro 2019 19:11)      SUBJECTIVE / OVERNIGHT EVENTS:  Patient seen and examined at bedside. This morning, she is resting comfortably in bed and reports no new issues or overnight events.     She reports:  [  ] CP  [  ] SOB  [  ] Fever  [  ] N /  [  ] V  [  ] Diarrhea  [X] None of the above    Other Review of Systems Negative.    MEDICATIONS  (STANDING):  amLODIPine   Tablet 5 milliGRAM(s) Oral daily  aspirin enteric coated 81 milliGRAM(s) Oral daily  benztropine 0.5 milliGRAM(s) Oral at bedtime  enoxaparin Injectable 40 milliGRAM(s) SubCutaneous daily  fluconAZOLE   Tablet 200 milliGRAM(s) Oral daily  haloperidol     Tablet 5 milliGRAM(s) Oral at bedtime  levoFLOXacin  Tablet 750 milliGRAM(s) Oral every 24 hours  linezolid    Tablet 600 milliGRAM(s) Oral every 12 hours  lisinopril 30 milliGRAM(s) Oral daily  metoprolol tartrate 50 milliGRAM(s) Oral two times a day    MEDICATIONS  (PRN):      OBJECTIVE:    Vital Signs Last 24 Hrs  T(C): 36.8 (2019 06:02), Max: 37.1 (15 Negro 2019 21:33)  T(F): 98.2 (2019 06:02), Max: 98.8 (15 Negro 2019 21:33)  HR: 96 (2019 06:02) (96 - 108)  BP: 129/70 (2019 06:02) (103/76 - 170/90)  BP(mean): --  RR: 17 (2019 06:02) (16 - 18)  SpO2: 100% (2019 06:02) (100% - 100%)     CAPILLARY BLOOD GLUCOSE      POCT Blood Glucose.: 151 mg/dL (2019 09:18)  POCT Blood Glucose.: 185 mg/dL (2019 06:10)  POCT Blood Glucose.: 152 mg/dL (2019 03:52)  POCT Blood Glucose.: 205 mg/dL (2019 01:47)  POCT Blood Glucose.: 183 mg/dL (15 Negro 2019 23:52)  POCT Blood Glucose.: 121 mg/dL (15 Negro 2019 22:01)  POCT Blood Glucose.: 139 mg/dL (15 Negro 2019 21:12)  POCT Blood Glucose.: 132 mg/dL (15 Negro 2019 20:01)  POCT Blood Glucose.: 180 mg/dL (15 Negro 2019 17:26)  POCT Blood Glucose.: 58 mg/dL (15 Negro 2019 16:40)  POCT Blood Glucose.: 57 mg/dL (15 Negro 2019 16:08)  POCT Blood Glucose.: 152 mg/dL (15 Negro 2019 13:36)  POCT Blood Glucose.: 72 mg/dL (15 Negro 2019 13:14)  POCT Blood Glucose.: 86 mg/dL (15 Negro 2019 12:49)    I&O's Summary      PHYSICAL EXAM:    	GENERAL: NAD, sitting up in bed, consistently talking with tangents  	HEAD:  Atraumatic, Normocephalic, poor dentition with missing teeth  	EYES: EOMI, PERRLA, conjunctiva and sclera clear  	NECK: Supple  	CHEST/LUNG: Clear to auscultation bilaterally; No wheezes or rales  	HEART: tachycardic, regular rhythm; No murmurs, rubs, or gallops  	ABDOMEN: Soft, Nontender, Nondistended; Bowel sounds present  	EXTREMITIES:  transmetatarsal amputation on right foot with stitching in place, dry skin, no active bleeding or pus  	PSYCH: AAOx3, persistent talking as above however able to answer questions  NEUROLOGY: non-focal    LABS:                        9.0    6.28  )-----------( 529      ( 2019 06:00 )             29.7     Auto Eosinophil # x     / Auto Eosinophil % x     / Auto Neutrophil # x     / Auto Neutrophil % x     / BANDS % x                            9.1    9.17  )-----------( 594      ( 15 Negro 2019 13:00 )             30.6     Auto Eosinophil # 0.00  / Auto Eosinophil % 0.0   / Auto Neutrophil # 7.83  / Auto Neutrophil % 85.4  / BANDS % x        06-16    133<L>  |  93<L>  |  6<L>  ----------------------------<  189<H>  4.7   |  26  |  0.65  06-15    138  |  97<L>  |  8   ----------------------------<  80  3.7   |  27  |  0.52    Ca    9.4      2019 06:00  Mg     1.8     -  Phos  3.5     -16  TPro  8.6<H>  /  Alb  3.5  /  TBili  0.3  /  DBili  x   /  AST  17  /  ALT  9   /  AlkPhos  86  06-15    PT/INR - ( 15 Negro 2019 13:21 )   PT: 14.6 SEC;   INR: 1.27          PTT - ( 15 Negro 2019 13:21 )  PTT:37.6 SEC      Urinalysis Basic - ( 2019 06:25 )    Color: COLORLESS / Appearance: TURBID / S.020 / pH: 7.5  Gluc: NEGATIVE / Ketone: NEGATIVE  / Bili: NEGATIVE / Urobili: NORMAL   Blood: NEGATIVE / Protein: 100 / Nitrite: NEGATIVE   Leuk Esterase: NEGATIVE / RBC: 0-2 / WBC 0-2   Sq Epi: MODERATE / Non Sq Epi: x / Bacteria: MOD            ABG:     VBG: ( 13:00 ) - VBG - pH: 7.33  | pCO2: 59    | pO2: 19    | Lactate: 1.4        Microbiology:        Care Discussed with Consultants/Other Providers:    RADIOLOGY & ADDITIONAL TESTS:  (Imaging Personally Reviewed)

## 2019-06-16 NOTE — PROGRESS NOTE ADULT - PROBLEM SELECTOR PLAN 5
- previously on sulfonylurea, insulin, metformin with hypoglycemic episode  - sulfonylurea d/albertina  - hypoglycemia management as above  - will hold all insulin for now until able to wean off D10 drip   - FS q2 for now - if remains stable on next check will extend to q4 - previously on sulfonylurea, insulin, metformin with hypoglycemic episode  - sulfonylurea d/albertina  - hypoglycemia management as above  - will hold all insulin for now until able to wean off D10 drip   - FS q4 as pt off d10 gtt - previously on sulfonylurea, insulin, metformin with hypoglycemic episode  - sulfonylurea d/albertina  - hypoglycemia management as above  - will hold all insulin for now until FS stabilizes off dextrose gtt

## 2019-06-16 NOTE — PROGRESS NOTE ADULT - PROBLEM SELECTOR PLAN 4
- unknown baseline, per d/c summary was noted at [place] as well  - last labs in system from 2015 with H/H 13.5/40.2  - TSH wnl, no s/s of bleeding  - will send basic anemia w/u - iron panel, folate, b12, retic, ferritin

## 2019-06-16 NOTE — PROGRESS NOTE ADULT - PROBLEM SELECTOR PLAN 1
- 2/2 hypoglycemia  - likely 2/2 decreased PO intake, insulin administration  - previous episode in setting of sulfonylurea and osteomyelitis   - per daughter, no longer on sulfonylurea  - last episode likely in setting of infection, currently afebrile, no leukocytosis and no complaints of infectious sources, right foot stump healing  - c/w antibiotics per d/c summary  - c/w FS q2 and D10 drip given drop again in ED after amp from EMS  - c/w PO intake  - will need optimization of DM regimen prior to d/c, will need to clarify insulin administration with other daughter who gives to patient - decrease vs d/c insulin  - f/u A1C  - CTH with chronic changes, no acute pathology  - if recurrent hypoglycemic episodes despite glucose therapy will consider recurrent sulfonylurea intox and give octreotide - 2/2 hypoglycemia  - likely 2/2 decreased PO intake, insulin administration  - previous episode in setting of sulfonylurea and osteomyelitis   - per daughter, no longer on sulfonylurea  - last episode likely in setting of infection, currently afebrile, no leukocytosis and no complaints of infectious sources, right foot stump healing  - c/w antibiotics per d/c summary  - c/w FS q2 and D10 drip given drop again in ED after amp from EMS  - c/w PO intake  - will need optimization of DM regimen prior to d/c, will need to clarify insulin administration with other daughter who gives to patient - decrease vs d/c insulin  - a1c 7.9  - CTH with chronic changes, no acute pathology  - if recurrent hypoglycemic episodes despite glucose therapy will consider recurrent sulfonylurea intox and give octreotide  - will restart insulin regimen after fsg stabilizes - 2/2 hypoglycemia  - likely 2/2 decreased PO intake, excessive insulin administration  - previous episode was in setting of sulfonylurea use and osteomyelitis   - per daughter, no longer on sulfonylurea  - last episode likely in setting of infection, currently afebrile, no leukocytosis and no complaints of infectious sources, right foot stump healing  - c/w antibiotics per d/c summary  - s/p FS q2 and D10 drip for drop again in ED after gettiing amp from EMS  - c/w PO intake  - will need optimization of DM regimen prior to d/c, will need to clarify insulin administration with other daughter who gives to patient - decrease vs d/c insulin  - a1c 7.9  - CTH with chronic changes, no acute pathology  - if recurrent hypoglycemic episodes despite glucose therapy will consider recurrent sulfonylurea intox and give octreotide  - will restart insulin regimen after fsg stabilizes

## 2019-06-17 ENCOUNTER — TRANSCRIPTION ENCOUNTER (OUTPATIENT)
Age: 69
End: 2019-06-17

## 2019-06-17 DIAGNOSIS — F20.9 SCHIZOPHRENIA, UNSPECIFIED: ICD-10-CM

## 2019-06-17 LAB
ANION GAP SERPL CALC-SCNC: 10 MMO/L — SIGNIFICANT CHANGE UP (ref 7–14)
BUN SERPL-MCNC: 10 MG/DL — SIGNIFICANT CHANGE UP (ref 7–23)
CALCIUM SERPL-MCNC: 8.9 MG/DL — SIGNIFICANT CHANGE UP (ref 8.4–10.5)
CHLORIDE SERPL-SCNC: 99 MMOL/L — SIGNIFICANT CHANGE UP (ref 98–107)
CO2 SERPL-SCNC: 28 MMOL/L — SIGNIFICANT CHANGE UP (ref 22–31)
CREAT SERPL-MCNC: 0.67 MG/DL — SIGNIFICANT CHANGE UP (ref 0.5–1.3)
GLUCOSE SERPL-MCNC: 165 MG/DL — HIGH (ref 70–99)
HCT VFR BLD CALC: 27.5 % — LOW (ref 34.5–45)
HGB BLD-MCNC: 8.3 G/DL — LOW (ref 11.5–15.5)
MAGNESIUM SERPL-MCNC: 1.8 MG/DL — SIGNIFICANT CHANGE UP (ref 1.6–2.6)
MCHC RBC-ENTMCNC: 27 PG — SIGNIFICANT CHANGE UP (ref 27–34)
MCHC RBC-ENTMCNC: 30.2 % — LOW (ref 32–36)
MCV RBC AUTO: 89.6 FL — SIGNIFICANT CHANGE UP (ref 80–100)
NRBC # FLD: 0 K/UL — SIGNIFICANT CHANGE UP (ref 0–0)
PHOSPHATE SERPL-MCNC: 3.9 MG/DL — SIGNIFICANT CHANGE UP (ref 2.5–4.5)
PLATELET # BLD AUTO: 405 K/UL — HIGH (ref 150–400)
PMV BLD: 8.5 FL — SIGNIFICANT CHANGE UP (ref 7–13)
POTASSIUM SERPL-MCNC: 4.5 MMOL/L — SIGNIFICANT CHANGE UP (ref 3.5–5.3)
POTASSIUM SERPL-SCNC: 4.5 MMOL/L — SIGNIFICANT CHANGE UP (ref 3.5–5.3)
RBC # BLD: 3.07 M/UL — LOW (ref 3.8–5.2)
RBC # FLD: 18.1 % — HIGH (ref 10.3–14.5)
SODIUM SERPL-SCNC: 137 MMOL/L — SIGNIFICANT CHANGE UP (ref 135–145)
WBC # BLD: 4.98 K/UL — SIGNIFICANT CHANGE UP (ref 3.8–10.5)
WBC # FLD AUTO: 4.98 K/UL — SIGNIFICANT CHANGE UP (ref 3.8–10.5)

## 2019-06-17 PROCEDURE — 90792 PSYCH DIAG EVAL W/MED SRVCS: CPT

## 2019-06-17 PROCEDURE — 99233 SBSQ HOSP IP/OBS HIGH 50: CPT | Mod: GC

## 2019-06-17 PROCEDURE — 99223 1ST HOSP IP/OBS HIGH 75: CPT

## 2019-06-17 RX ORDER — HALOPERIDOL DECANOATE 100 MG/ML
7.5 INJECTION INTRAMUSCULAR AT BEDTIME
Refills: 0 | Status: DISCONTINUED | OUTPATIENT
Start: 2019-06-17 | End: 2019-06-18

## 2019-06-17 RX ORDER — INSULIN LISPRO 100/ML
2 VIAL (ML) SUBCUTANEOUS
Refills: 0 | Status: DISCONTINUED | OUTPATIENT
Start: 2019-06-17 | End: 2019-06-18

## 2019-06-17 RX ADMIN — LINEZOLID 600 MILLIGRAM(S): 600 INJECTION, SOLUTION INTRAVENOUS at 05:53

## 2019-06-17 RX ADMIN — Medication 2: at 13:15

## 2019-06-17 RX ADMIN — AMLODIPINE BESYLATE 5 MILLIGRAM(S): 2.5 TABLET ORAL at 05:53

## 2019-06-17 RX ADMIN — LISINOPRIL 30 MILLIGRAM(S): 2.5 TABLET ORAL at 05:54

## 2019-06-17 RX ADMIN — FLUCONAZOLE 200 MILLIGRAM(S): 150 TABLET ORAL at 13:16

## 2019-06-17 RX ADMIN — Medication 81 MILLIGRAM(S): at 13:16

## 2019-06-17 RX ADMIN — INSULIN GLARGINE 11 UNIT(S): 100 INJECTION, SOLUTION SUBCUTANEOUS at 22:25

## 2019-06-17 RX ADMIN — LINEZOLID 600 MILLIGRAM(S): 600 INJECTION, SOLUTION INTRAVENOUS at 18:28

## 2019-06-17 RX ADMIN — Medication 1: at 22:24

## 2019-06-17 RX ADMIN — ENOXAPARIN SODIUM 40 MILLIGRAM(S): 100 INJECTION SUBCUTANEOUS at 13:16

## 2019-06-17 RX ADMIN — Medication 50 MILLIGRAM(S): at 18:28

## 2019-06-17 RX ADMIN — Medication 50 MILLIGRAM(S): at 05:53

## 2019-06-17 NOTE — PROGRESS NOTE ADULT - PROBLEM SELECTOR PROBLEM 5
Type 2 diabetes mellitus with other circulatory complication, with long-term current use of insulin Hypertension

## 2019-06-17 NOTE — PROGRESS NOTE ADULT - PROBLEM SELECTOR PLAN 1
- 2/2 hypoglycemia  - likely 2/2 decreased PO intake, excessive insulin administration  - previous episode was in setting of sulfonylurea use and osteomyelitis   - per daughter, no longer on sulfonylurea  - last episode likely in setting of infection, currently afebrile, no leukocytosis and no complaints of infectious sources, right foot stump healing  - c/w antibiotics per d/c summary  - s/p FS q2 and D10 drip for drop again in ED after gettiing amp from EMS  - c/w PO intake  - will need optimization of DM regimen prior to d/c.   - dietician consult for hypoglycemia  - a1c 7.9  - CTH with chronic changes, no acute pathology - 2/2 hypoglycemia  - likely 2/2 decreased PO intake, excessive insulin administration  - previous episode was in setting of sulfonylurea use and osteomyelitis   - per daughter, no longer on sulfonylurea  - last episode likely in setting of infection, currently afebrile, no leukocytosis and no complaints of infectious sources, right foot stump healing  - s/p FS q2 and D10 drip for drop again in ED after gettiing amp from EMS  - c/w PO intake  - will need optimization of DM regimen prior to d/c.   - dietician consult for hypoglycemia  - a1c 7.9  - CTH with chronic changes, no acute pathology

## 2019-06-17 NOTE — BEHAVIORAL HEALTH ASSESSMENT NOTE - HPI (INCLUDE ILLNESS QUALITY, SEVERITY, DURATION, TIMING, CONTEXT, MODIFYING FACTORS, ASSOCIATED SIGNS AND SYMPTOMS)
The patient is a 69 year old woman with a history of DM2 c/b neuropathy with recent transmetatarsal amputation, HTN, has a chronic psychiatric history notable for Chronic Schizophrenia, has a history of prior inpatient psychiatric admissions- last admitted one year ago at [facility], no history of SA, no h/o aggression, presenting with metabolic encephalopathy 2/2 hypoglycemia in the setting of accidental insulin overdose. Psychiatry consultation has been requested to assess ongoing psychosis which could be contributing to her noncompliance with medical medications. No behavioral issues, calm, compliant  Met with the patient. Calm, engages well with interview. TP can be noted to be overinclusive, tangential, and at times circumstantial. Does endorse intermittent paranoia- 'someone attacked me and did something to my leg', etc. Does endorse intermittent AH- 'hears family members talking'. Denies any CAH. Denies any mood symptoms when asked, denies any si or hi. Alert and oriented.   Care coordinated with daughter over phone- Dallinma [phone]. She states that patient has a history of schizophrenia, has been noncompliant- refusing f/u in community. Daughter states that patient does have distrust towards family, and resultingly at times will insist on taking medications herself- ' she thinks we might be poisoning her'. Daughter states that patient can be noncompliant because of this. Daughter is ambivalent about disposition at this point- inpatient psych vs outpatient f/ while ensuring patient gets supervision at home and ensured safety. Daughter states that she would want to take patient home with an increased dose of Haldol, and with outpatient psych f.u at [facility]. Daughter will call again tomorrow and disposition will be finalized.

## 2019-06-17 NOTE — PROGRESS NOTE ADULT - ASSESSMENT
69F with DM2 c/b neuropathy with recent transmetatarsal amputation, HTN presenting with metabolic encephalopathy 2/2 hypoglycemia in the setting of accidental insulin overdose.

## 2019-06-17 NOTE — CONSULT NOTE ADULT - ASSESSMENT
69F DM2 with neuropathy s/p R TMA, several admissions for hypoglycemia, HbA1c 7.9%.    1. DM2  While inpatient no further hypoglycemia on lower dose of basal insulin.  Continue Lantus 11 units qhs  Add Humalog 2/2/2  Continue low correction scales  GFR wnl    DC plan:  Check c-peptide to clarify if non-insulin regimen is acceptable.  Otherwise would plan to dc on lower dose of basal insulin and have daughter help with administration, as well as resume metformin.  No sulfonylurea.  Recommend outpatient endocrine follow up [phone]. 69F DM2 with neuropathy s/p R TMA, several admissions for hypoglycemia, HbA1c 7.9% fair but inclusive of hypoglycemic events.    1. DM2  While inpatient no further hypoglycemia on lower dose of basal insulin.  Continue Lantus 11 units qhs  Add Humalog 2/2/2  Continue low correction scales  GFR wnl    DC plan:  Check c-peptide to clarify if non-insulin regimen is acceptable.  Otherwise would plan to dc on lower dose of basal insulin and have daughter help with administration, as well as resume metformin.  No sulfonylurea.  Recommend outpatient endocrine follow up [phone].    2. HTN  BP goal < 130/80  Continue current regimen including ACEI

## 2019-06-17 NOTE — DISCHARGE NOTE NURSING/CASE MANAGEMENT/SOCIAL WORK - NSDCPNINST_GEN_ALL_CORE
Mrs Clay is alert and cooperative , talkative with staff, even though she mumbles .  Family will be responsible for Medication administration at Home; she has the Surgical Sanchez for the TMA ; dressing is done with Betadine DSD , Abdominal pad and wrapped with Kerlix.  She will be discharged on Augmentin for 10 days and all educational Material are given to her family , Side Effects reviewed.  Her Glucose is stable in the 140's and is stable with the Lantus and sliding scale Insulin.  All the Sutures on the surgical site are intact.  Vital Signs are stable and Discharge Instructions are explained and provided.

## 2019-06-17 NOTE — BEHAVIORAL HEALTH ASSESSMENT NOTE - SUMMARY
The patient is a 69 year old woman with a history of DM2 c/b neuropathy with recent transmetatarsal amputation, HTN, has a chronic psychiatric history notable for Chronic Schizophrenia, has a history of prior inpatient psychiatric admissions- last admitted one year ago at [facility], no history of SA, no h/o aggression, presenting with metabolic encephalopathy 2/2 hypoglycemia in the setting of accidental insulin overdose. Psychiatry consultation has been requested to assess ongoing psychosis which could be contributing to her noncompliance with medical medications. No behavioral issues, calm, compliant  The patient has a history of chronic schizophrenia, noncompliant with f.u and inconsistent in compliance with medical+ psychiatric medications. Has paranoia, and intermittent AH. No history of aggression. Family at this point wants to take the patient home with increased dose of haldol, and outpatient psych f/u. They will bring patient to ed if any worsening symptoms.     Recommendations  - Routine observation  - Collaborate care with daughter  - INCREASE dose of Haldol to 7.5mg q hs po. Daughter in agreement  - Continue Cogentin

## 2019-06-17 NOTE — PROGRESS NOTE ADULT - PROBLEM SELECTOR PLAN 3
- c/w home regimen  - lisinopril 30, amlodipine 5, metoprolol tart 50 - previously on sulfonylurea, insulin, metformin with hypoglycemic episode  - sulfonylurea d/albertina  - hypoglycemia management as above  - endocrinology consulted for recurrent hypoglycemia

## 2019-06-17 NOTE — PROGRESS NOTE ADULT - PROBLEM SELECTOR PLAN 6
- on haldol and benztropine at home  - repeat ekg 475, will c/w haldol and benztropine  - Pt needs follow up with psych as she is prescribed haldol by her PCP - unknown baseline, per d/c summary was noted at [place] as well  - last labs in system from 2015 with H/H 13.5/40.2  - TSH wnl, no s/s of bleeding  - ferritin high, iron low, likely from chronic disease.

## 2019-06-17 NOTE — PROGRESS NOTE ADULT - PROBLEM SELECTOR PROBLEM 3
Hypertension Type 2 diabetes mellitus with other circulatory complication, with long-term current use of insulin

## 2019-06-17 NOTE — DISCHARGE NOTE NURSING/CASE MANAGEMENT/SOCIAL WORK - NSDCDPATPORTLINK_GEN_ALL_CORE
You can access the [facility], offered by [facility], by registering with the following website: [email or web address]

## 2019-06-17 NOTE — BEHAVIORAL HEALTH ASSESSMENT NOTE - NSBHCHARTREVIEWLAB_PSY_A_CORE FT
CBC Full  -  ( 17 Jun 2019 07:52 )  WBC Count : 4.98 K/uL  RBC Count : 3.07 M/uL  Hemoglobin : 8.3 g/dL  Hematocrit : 27.5 %  Platelet Count - Automated : 405 K/uL  Mean Cell Volume : 89.6 fL  Mean Cell Hemoglobin : 27.0 pg  Mean Cell Hemoglobin Concentration : 30.2 %  Auto Neutrophil # : x  Auto Lymphocyte # : x  Auto Monocyte # : x  Auto Eosinophil # : x  Auto Basophil # : x  Auto Neutrophil % : x  Auto Lymphocyte % : x  Auto Monocyte % : x  Auto Eosinophil % : x  Auto Basophil % : x  06-17    137  |  99  |  10  ----------------------------<  165<H>  4.5   |  28  |  0.67    Ca    8.9      17 Jun 2019 07:52  Phos  3.9     06-17  Mg     1.8     06-17

## 2019-06-17 NOTE — BEHAVIORAL HEALTH ASSESSMENT NOTE - SUICIDALITY
30-year-old white female here for recheck on her right knee.  She fell on March 7th.  Was seen in the office had an x-ray done.  Bones were negative.  Continues to have some swelling.  Worse when she is on her feet.  No clicking or clunking.  Feels like it wants to give out from time to time.  Has tried some anti-inflammatories.    Weight has gone up again.  Couple years ago she was on phentermine and lost 36 lb.  Would like to start that again.    Current Outpatient Medications   Medication Sig Dispense Refill   • phentermine (ADIPEX-P) 37.5 MG tablet Take 1 tablet by mouth daily (before breakfast). 30 tablet 0     No current facility-administered medications for this visit.      Patient Active Problem List   Diagnosis   • Depression   • Nickel allergy   • Eczema   • ADD (attention deficit disorder)   • Obesity (BMI 30-39.9)     Physical exam  Obese white female no acute distress alert oriented x3.  Here with her 3-year-old.  Blood pressure 120/78, pulse 76, height 5' 5.5\" (1.664 m), weight 126.6 kg, last menstrual period 04/20/2019.  Right knee has some generalized swelling and an abrasion.  Some tenderness along the lateral joint line  No ligamentous instability  Edd testing cause some pain but was nondiagnostic.  Gait fairly normal    Assessment plan  1. Previous knee injury with some continued swelling.  No insurance.  Is going to be working with a .  I discussed quadriceps exercises with her.  Follow clinically.  Referral to ortho if not improving or symptoms progressed.  2. Obesity.  Restart phentermine 37.5 mg daily.  Follow-up in 1 month for recheck.  Instructions reviewed.  PDMP reviewed and appropriate.     None known in lifetime

## 2019-06-17 NOTE — PROGRESS NOTE ADULT - PROBLEM SELECTOR PLAN 7
- dx at [facility] last week s/p right transmetatarsal amputation  - c/w linezolid, fluconazole, levaquin - d/c with 14 day course to end 6/24  - PT consulted - dx at [facility] last week s/p right transmetatarsal amputation  - c/w linezolid, fluconazole, levaquin - d/c with 14 day course to end 6/24  - PT consulted  - pending wound care recs

## 2019-06-17 NOTE — PROGRESS NOTE ADULT - PROBLEM SELECTOR PLAN 4
- unknown baseline, per d/c summary was noted at [place] as well  - last labs in system from 2015 with H/H 13.5/40.2  - TSH wnl, no s/s of bleeding  - ferritin high, iron low, likely from chronic disease. - on haldol and benztropine at home  - repeat ekg 475, will c/w haldol and benztropine  - psych consulted as pt is noncompliant with meds and her paranoia is contributing to her insulin noncompliance and subsequent hypoglycemic episodes

## 2019-06-17 NOTE — BEHAVIORAL HEALTH ASSESSMENT NOTE - NSBHCHARTREVIEWVS_PSY_A_CORE FT
Vital Signs Last 24 Hrs  T(C): 36.6 (17 Jun 2019 14:36), Max: 36.6 (17 Jun 2019 05:50)  T(F): 97.8 (17 Jun 2019 14:36), Max: 97.8 (17 Jun 2019 05:50)  HR: 78 (17 Jun 2019 14:36) (73 - 81)  BP: 117/55 (17 Jun 2019 14:36) (117/55 - 121/59)  BP(mean): --  RR: 18 (17 Jun 2019 14:36) (18 - 18)  SpO2: 100% (17 Jun 2019 14:36) (100% - 100%)

## 2019-06-17 NOTE — CONSULT NOTE ADULT - SUBJECTIVE AND OBJECTIVE BOX
HPI:  69F with DM2 c/b neuropathy with recent transmetatarsal amputation, schizophrenia, HTN coming to the ED with AMS at home. History largely obtained from daughter at bedside due to patient's underlying schizophrenia. Daughter states patient was in normal state of health yesterday, ate dinner and went to bed. She takes haldol at night per daughter which sometimes will make her more sleepy but usually is tolerated. In the AM she did not wake up at her usual time which family attributed to haldol at first. She continued to sleep and later in the morning family tried to wake her but she was unarousable so they checked her sugar which was 24. They called 911 and patient was given dextrose which resulted in drastic improvement in mental status. Patient was recently hospitalized in [facility] for hypoglycemia and found to have osteomyelitis in right foot with ulcer. Underwent transmetatarsal amputation on 6/6 and was d/c on linezolid, fluconazole and levaquin. She was previously on a sulfonylurea which was d/albertina on admission. She was d/c on Monday 6/10 and has been normal sense however has had decreased PO intake since admission. Daughter at bedside states she is now back to baseline. In ED, noted to have FS to 70-80s however dropped again to 50s - given dextrose 50 and started on D10 drip.     Endocrine history:  DM2 x many years.  Lives with a daughter but she is self administering insulin.  Admitted for hypoglycemic event (glucose found to be 24 mg/dl) where she was unarousable after taking too much insulin.  She states she self adjusts her insulin dose (Lantus or levemir) ranging from zero units to as high as 40 units.  She also takes metformin 1000mg BID. She used to take a sulfonylurea but this was stopped after a previous hypoglycemic event.  She has a history of neuropathy and R TMA.  Patient is asking if her foot will grow back. When told that it would not she states that her foot will indeed grow back and she has seen that happen before.  Patient denies a diet high in sweets/starches.      PAST MEDICAL & SURGICAL HISTORY:  Schizophrenia  Hypertension  Diabetes: insulin dependent  S/P appendectomy      FAMILY HISTORY:  No pertinent family history in first degree relatives      Social History: no tobacco    Outpatient Medications: see HPI    MEDICATIONS  (STANDING):  amLODIPine   Tablet 5 milliGRAM(s) Oral daily  aspirin enteric coated 81 milliGRAM(s) Oral daily  benztropine 0.5 milliGRAM(s) Oral at bedtime  dextrose 5%. 1000 milliLiter(s) (50 mL/Hr) IV Continuous <Continuous>  dextrose 50% Injectable 12.5 Gram(s) IV Push once  dextrose 50% Injectable 25 Gram(s) IV Push once  dextrose 50% Injectable 25 Gram(s) IV Push once  enoxaparin Injectable 40 milliGRAM(s) SubCutaneous daily  fluconAZOLE   Tablet 200 milliGRAM(s) Oral daily  haloperidol     Tablet 7.5 milliGRAM(s) Oral at bedtime  insulin glargine Injectable (LANTUS) 11 Unit(s) SubCutaneous at bedtime  insulin lispro (HumaLOG) corrective regimen sliding scale   SubCutaneous three times a day before meals  insulin lispro (HumaLOG) corrective regimen sliding scale   SubCutaneous at bedtime  levoFLOXacin  Tablet 750 milliGRAM(s) Oral every 24 hours  linezolid    Tablet 600 milliGRAM(s) Oral every 12 hours  lisinopril 30 milliGRAM(s) Oral daily  metoprolol tartrate 50 milliGRAM(s) Oral two times a day    MEDICATIONS  (PRN):  dextrose 40% Gel 15 Gram(s) Oral once PRN Blood Glucose LESS THAN 70 milliGRAM(s)/deciliter  glucagon  Injectable 1 milliGRAM(s) IntraMuscular once PRN Glucose LESS THAN 70 milligrams/deciliter      Allergies    No Known Drug Allergies  sudafed (Hives)    Intolerances      Review of Systems:  Constitutional: No fever  Eyes: No blurry vision  Neuro: No tremors  HEENT: No pain  Cardiovascular: No chest pain, palpitations  Respiratory: No SOB, no cough  GI: No nausea, vomiting, abdominal pain  : No dysuria  Skin: no rash  Psych: no depression  Endocrine: no polyuria, polydipsia  Hem/lymph: no swelling  Osteoporosis: no fractures    ALL OTHER SYSTEMS REVIEWED AND NEGATIVE      PHYSICAL EXAM:  VITALS: T(C): 36.6 (06-17-19 @ 14:36)  T(F): 97.8 (06-17-19 @ 14:36), Max: 97.8 (06-17-19 @ 05:50)  HR: 81 (06-17-19 @ 18:27) (73 - 81)  BP: 125/62 (06-17-19 @ 18:27) (117/55 - 125/62)  RR:  (18 - 18)  SpO2:  (100% - 100%)  Wt(kg): --  GENERAL: NAD, comfortable  EYES: No proptosis, no lid lag, anicteric  HEENT:  Atraumatic, Normocephalic, moist mucous membranes, poor dentition  THYROID: Normal size, no palpable nodules  RESPIRATORY: Clear to auscultation bilaterally; No rales, rhonchi, wheezing  CARDIOVASCULAR: Regular rate and rhythm; No murmurs; no peripheral edema  GI: Soft, nontender, non distended, normal bowel sounds  SKIN: R TMA in dressing  Musculoskel: TMA as above, otherwise moving all extremities.  NEURO: sensation intact, extraocular movements intact, no tremor  PSYCH: Alert and oriented x 3, normal affect, normal mood  CUSHING'S SIGNS: no striae      CAPILLARY BLOOD GLUCOSE      POCT Blood Glucose.: 124 mg/dL (17 Jun 2019 18:00)  POCT Blood Glucose.: 236 mg/dL (17 Jun 2019 12:32)  POCT Blood Glucose.: 148 mg/dL (17 Jun 2019 08:44)  POCT Blood Glucose.: 325 mg/dL (16 Jun 2019 22:06)                            8.3    4.98  )-----------( 405      ( 17 Jun 2019 07:52 )             27.5       06-17    137  |  99  |  10  ----------------------------<  165<H>  4.5   |  28  |  0.67    EGFR if : 105  EGFR if non : 91    Ca    8.9      06-17  Mg     1.8     06-17  Phos  3.9     06-17    TPro  8.6<H>  /  Alb  3.5  /  TBili  0.3  /  DBili  x   /  AST  17  /  ALT  9   /  AlkPhos  86  06-15      Thyroid Function Tests:  06-15 @ 13:00 TSH 3.44 FreeT4 -- T3 -- Anti TPO -- Anti Thyroglobulin Ab -- TSI --      Hemoglobin A1C, Whole Blood: 7.9 % <H> [4.0 - 5.6] (06-16-19 @ 05:00)          Radiology: HPI:  69F with DM2 c/b neuropathy with recent transmetatarsal amputation, schizophrenia, HTN coming to the ED with AMS at home. History largely obtained from daughter at bedside due to patient's underlying schizophrenia. Daughter states patient was in normal state of health yesterday, ate dinner and went to bed. She takes haldol at night per daughter which sometimes will make her more sleepy but usually is tolerated. In the AM she did not wake up at her usual time which family attributed to haldol at first. She continued to sleep and later in the morning family tried to wake her but she was unarousable so they checked her sugar which was 24. They called 911 and patient was given dextrose which resulted in drastic improvement in mental status. Patient was recently hospitalized in [facility] for hypoglycemia and found to have osteomyelitis in right foot with ulcer. Underwent transmetatarsal amputation on 6/6 and was d/c on linezolid, fluconazole and levaquin. She was previously on a sulfonylurea which was d/albertina on admission. She was d/c on Monday 6/10 and has been normal sense however has had decreased PO intake since admission. Daughter at bedside states she is now back to baseline. In ED, noted to have FS to 70-80s however dropped again to 50s - given dextrose 50 and started on D10 drip.     Endocrine history:  DM2 x many years.  Lives with a daughter but she is self administering insulin.  Admitted for hypoglycemic event (glucose found to be 24 mg/dl) where she was unarousable after taking too much insulin.  She states she self adjusts her insulin dose (Lantus or levemir) ranging from zero units to as high as 40 units.  She also takes metformin 1000mg BID. She used to take a sulfonylurea but this was stopped after a previous hypoglycemic event.  She has a history of neuropathy and R TMA.  Patient is asking if her foot will grow back. When told that it would not she states that her foot will indeed grow back and she has seen that happen before.  Patient denies a diet high in sweets/starches.  She does not see an endocrinologist.      PAST MEDICAL & SURGICAL HISTORY:  Schizophrenia  Hypertension  Diabetes: insulin dependent  S/P appendectomy      FAMILY HISTORY:  No pertinent family history in first degree relatives      Social History: no tobacco    Outpatient Medications: see HPI    MEDICATIONS  (STANDING):  amLODIPine   Tablet 5 milliGRAM(s) Oral daily  aspirin enteric coated 81 milliGRAM(s) Oral daily  benztropine 0.5 milliGRAM(s) Oral at bedtime  dextrose 5%. 1000 milliLiter(s) (50 mL/Hr) IV Continuous <Continuous>  dextrose 50% Injectable 12.5 Gram(s) IV Push once  dextrose 50% Injectable 25 Gram(s) IV Push once  dextrose 50% Injectable 25 Gram(s) IV Push once  enoxaparin Injectable 40 milliGRAM(s) SubCutaneous daily  fluconAZOLE   Tablet 200 milliGRAM(s) Oral daily  haloperidol     Tablet 7.5 milliGRAM(s) Oral at bedtime  insulin glargine Injectable (LANTUS) 11 Unit(s) SubCutaneous at bedtime  insulin lispro (HumaLOG) corrective regimen sliding scale   SubCutaneous three times a day before meals  insulin lispro (HumaLOG) corrective regimen sliding scale   SubCutaneous at bedtime  levoFLOXacin  Tablet 750 milliGRAM(s) Oral every 24 hours  linezolid    Tablet 600 milliGRAM(s) Oral every 12 hours  lisinopril 30 milliGRAM(s) Oral daily  metoprolol tartrate 50 milliGRAM(s) Oral two times a day    MEDICATIONS  (PRN):  dextrose 40% Gel 15 Gram(s) Oral once PRN Blood Glucose LESS THAN 70 milliGRAM(s)/deciliter  glucagon  Injectable 1 milliGRAM(s) IntraMuscular once PRN Glucose LESS THAN 70 milligrams/deciliter      Allergies    No Known Drug Allergies  sudafed (Hives)    Intolerances      Review of Systems:  Constitutional: No fever  Eyes: No blurry vision  Neuro: No tremors  HEENT: No pain  Cardiovascular: No chest pain, palpitations  Respiratory: No SOB, no cough  GI: No nausea, vomiting, abdominal pain  : No dysuria  Skin: no rash  Psych: no depression  Endocrine: no polyuria, polydipsia  Hem/lymph: no swelling  Osteoporosis: no fractures    ALL OTHER SYSTEMS REVIEWED AND NEGATIVE      PHYSICAL EXAM:  VITALS: T(C): 36.6 (06-17-19 @ 14:36)  T(F): 97.8 (06-17-19 @ 14:36), Max: 97.8 (06-17-19 @ 05:50)  HR: 81 (06-17-19 @ 18:27) (73 - 81)  BP: 125/62 (06-17-19 @ 18:27) (117/55 - 125/62)  RR:  (18 - 18)  SpO2:  (100% - 100%)  Wt(kg): --  GENERAL: NAD, comfortable  EYES: No proptosis, no lid lag, anicteric  HEENT:  Atraumatic, Normocephalic, moist mucous membranes, poor dentition  THYROID: Normal size, no palpable nodules  RESPIRATORY: Clear to auscultation bilaterally; No rales, rhonchi, wheezing  CARDIOVASCULAR: Regular rate and rhythm; No murmurs; no peripheral edema  GI: Soft, nontender, non distended, normal bowel sounds  SKIN: R TMA in dressing  Musculoskel: TMA as above, otherwise moving all extremities.  NEURO: sensation intact, extraocular movements intact, no tremor  PSYCH: Alert and oriented x 3, normal affect, normal mood  CUSHING'S SIGNS: no striae      CAPILLARY BLOOD GLUCOSE      POCT Blood Glucose.: 124 mg/dL (17 Jun 2019 18:00)  POCT Blood Glucose.: 236 mg/dL (17 Jun 2019 12:32)  POCT Blood Glucose.: 148 mg/dL (17 Jun 2019 08:44)  POCT Blood Glucose.: 325 mg/dL (16 Jun 2019 22:06)                            8.3    4.98  )-----------( 405      ( 17 Jun 2019 07:52 )             27.5       06-17    137  |  99  |  10  ----------------------------<  165<H>  4.5   |  28  |  0.67    EGFR if : 105  EGFR if non : 91    Ca    8.9      06-17  Mg     1.8     06-17  Phos  3.9     06-17    TPro  8.6<H>  /  Alb  3.5  /  TBili  0.3  /  DBili  x   /  AST  17  /  ALT  9   /  AlkPhos  86  06-15      Thyroid Function Tests:  06-15 @ 13:00 TSH 3.44 FreeT4 -- T3 -- Anti TPO -- Anti Thyroglobulin Ab -- TSI --      Hemoglobin A1C, Whole Blood: 7.9 % <H> [4.0 - 5.6] (06-16-19 @ 05:00)          Radiology:

## 2019-06-17 NOTE — PROGRESS NOTE ADULT - PROBLEM SELECTOR PLAN 5
- previously on sulfonylurea, insulin, metformin with hypoglycemic episode  - sulfonylurea d/albertina  - hypoglycemia management as above - c/w home regimen  - lisinopril 30, amlodipine 5, metoprolol tart 50

## 2019-06-17 NOTE — PROGRESS NOTE ADULT - SUBJECTIVE AND OBJECTIVE BOX
=========================================  CONTACT INFO  Siddharth Stevens M.D., PGY-1  Pager: [phone]- [phone]    Mon-Fri: pager covered by day team 7am-7pm;   Sa/Sun: see chart, primary physician assigned available 7am-12pm  Sat/[place] Coverage 12pm-7pm: [facility] - pager forwarded to covering Resident  For Night coverage 7pm-7am:  [facility]- page [phone] for Red, [phone] for Blue  =========================================    Patient is a 69y old  Female who presents with a chief complaint of encephalopathy (2019 12:35)      SUBJECTIVE / OVERNIGHT EVENTS:  Patient seen and examined at bedside. Pt denies chest pain, no shortness of breath, no abd pain. Pt says her foot pain is under control and only hurts when she presses on it.       Other Review of Systems Negative.    MEDICATIONS  (STANDING):  amLODIPine   Tablet 5 milliGRAM(s) Oral daily  aspirin enteric coated 81 milliGRAM(s) Oral daily  benztropine 0.5 milliGRAM(s) Oral at bedtime  dextrose 5%. 1000 milliLiter(s) (50 mL/Hr) IV Continuous <Continuous>  dextrose 50% Injectable 12.5 Gram(s) IV Push once  dextrose 50% Injectable 25 Gram(s) IV Push once  dextrose 50% Injectable 25 Gram(s) IV Push once  enoxaparin Injectable 40 milliGRAM(s) SubCutaneous daily  fluconAZOLE   Tablet 200 milliGRAM(s) Oral daily  haloperidol     Tablet 5 milliGRAM(s) Oral at bedtime  insulin glargine Injectable (LANTUS) 11 Unit(s) SubCutaneous at bedtime  insulin lispro (HumaLOG) corrective regimen sliding scale   SubCutaneous three times a day before meals  insulin lispro (HumaLOG) corrective regimen sliding scale   SubCutaneous at bedtime  levoFLOXacin  Tablet 750 milliGRAM(s) Oral every 24 hours  linezolid    Tablet 600 milliGRAM(s) Oral every 12 hours  lisinopril 30 milliGRAM(s) Oral daily  metoprolol tartrate 50 milliGRAM(s) Oral two times a day    MEDICATIONS  (PRN):  dextrose 40% Gel 15 Gram(s) Oral once PRN Blood Glucose LESS THAN 70 milliGRAM(s)/deciliter  glucagon  Injectable 1 milliGRAM(s) IntraMuscular once PRN Glucose LESS THAN 70 milligrams/deciliter      OBJECTIVE:    Vital Signs Last 24 Hrs  T(C): 36.6 (2019 05:50), Max: 37 (2019 13:34)  T(F): 97.8 (2019 05:50), Max: 98.6 (2019 13:34)  HR: 73 (2019 05:50) (73 - 81)  BP: 118/62 (2019 05:50) (118/62 - 121/59)  BP(mean): --  RR: 18 (2019 05:50) (18 - 19)  SpO2: 100% (2019 05:50) (100% - 100%)     CAPILLARY BLOOD GLUCOSE  180 (2019 12:31)      POCT Blood Glucose.: 148 mg/dL (2019 08:44)  POCT Blood Glucose.: 325 mg/dL (2019 22:06)  POCT Blood Glucose.: 171 mg/dL (2019 17:37)  POCT Blood Glucose.: 180 mg/dL (2019 12:31)  POCT Blood Glucose.: 151 mg/dL (2019 09:18)    I&O's Summary      PHYSICAL EXAM:    	GENERAL: NAD, sitting up in bed, consistently talking with tangents  	HEAD:  Atraumatic, Normocephalic, poor dentition with missing teeth  	EYES: EOMI, PERRLA, conjunctiva and sclera clear  	NECK: Supple  	CHEST/LUNG: Clear to auscultation bilaterally; No wheezes or rales  	HEART: tachycardic, regular rhythm; No murmurs, rubs, or gallops  	ABDOMEN: Soft, Nontender, Nondistended; Bowel sounds present  	EXTREMITIES:  transmetatarsal amputation on right foot, no active bleeding or pus, bandages clean and dry  	PSYCH: AAOx3, persistent talking as above however able to answer questions  NEUROLOGY: non-focal      LABS:                        8.3    4.98  )-----------( 405      ( 2019 07:52 )             27.5     Auto Eosinophil # x     / Auto Eosinophil % x     / Auto Neutrophil # x     / Auto Neutrophil % x     / BANDS % x                            9.0    6.28  )-----------( 529      ( 2019 06:00 )             29.7     Auto Eosinophil # x     / Auto Eosinophil % x     / Auto Neutrophil # x     / Auto Neutrophil % x     / BANDS % x                            9.1    9.17  )-----------( 594      ( 15 Negro 2019 13:00 )             30.6     Auto Eosinophil # 0.00  / Auto Eosinophil % 0.0   / Auto Neutrophil # 7.83  / Auto Neutrophil % 85.4  / BANDS % x        17    137  |  99  |  10  ----------------------------<  165<H>  4.5   |  28  |  0.67      133<L>  |  93<L>  |  6<L>  ----------------------------<  189<H>  4.7   |  26  |  0.65  06-15    138  |  97<L>  |  8   ----------------------------<  80  3.7   |  27  |  0.52    Ca    8.9      2019 07:52  Mg     1.8       Phos  3.9       TPro  8.6<H>  /  Alb  3.5  /  TBili  0.3  /  DBili  x   /  AST  17  /  ALT  9   /  AlkPhos  86  15    PT/INR - ( 15 Negro 2019 13:21 )   PT: 14.6 SEC;   INR: 1.27          PTT - ( 15 Negro 2019 13:21 )  PTT:37.6 SEC      Urinalysis Basic - ( 2019 06:25 )    Color: COLORLESS / Appearance: TURBID / S.020 / pH: 7.5  Gluc: NEGATIVE / Ketone: NEGATIVE  / Bili: NEGATIVE / Urobili: NORMAL   Blood: NEGATIVE / Protein: 100 / Nitrite: NEGATIVE   Leuk Esterase: NEGATIVE / RBC: 0-2 / WBC 0-2   Sq Epi: MODERATE / Non Sq Epi: x / Bacteria: MOD            ABG:     VBG:     Microbiology:        Care Discussed with Consultants/Other Providers:    RADIOLOGY & ADDITIONAL TESTS:  (Imaging Personally Reviewed)

## 2019-06-18 ENCOUNTER — INPATIENT (INPATIENT)
Facility: HOSPITAL | Age: 69
LOS: 13 days | Discharge: HOME | End: 2019-07-02
Attending: PSYCHIATRY & NEUROLOGY | Admitting: PSYCHIATRY & NEUROLOGY
Payer: MEDICARE

## 2019-06-18 ENCOUNTER — TRANSCRIPTION ENCOUNTER (OUTPATIENT)
Age: 69
End: 2019-06-18

## 2019-06-18 VITALS — RESPIRATION RATE: 18 BRPM | TEMPERATURE: 98 F | HEIGHT: 62 IN | WEIGHT: 141.76 LBS

## 2019-06-18 VITALS
OXYGEN SATURATION: 100 % | RESPIRATION RATE: 18 BRPM | HEART RATE: 81 BPM | SYSTOLIC BLOOD PRESSURE: 145 MMHG | TEMPERATURE: 98 F | DIASTOLIC BLOOD PRESSURE: 76 MMHG

## 2019-06-18 DIAGNOSIS — Z98.89 OTHER SPECIFIED POSTPROCEDURAL STATES: Chronic | ICD-10-CM

## 2019-06-18 DIAGNOSIS — F20.9 SCHIZOPHRENIA, UNSPECIFIED: ICD-10-CM

## 2019-06-18 DIAGNOSIS — Z71.89 OTHER SPECIFIED COUNSELING: ICD-10-CM

## 2019-06-18 DIAGNOSIS — F20.89 OTHER SCHIZOPHRENIA: ICD-10-CM

## 2019-06-18 DIAGNOSIS — F25.9 SCHIZOAFFECTIVE DISORDER, UNSPECIFIED: ICD-10-CM

## 2019-06-18 PROBLEM — Z00.00 ENCOUNTER FOR PREVENTIVE HEALTH EXAMINATION: Status: ACTIVE | Noted: 2019-06-18

## 2019-06-18 LAB
ANION GAP SERPL CALC-SCNC: 13 MMO/L — SIGNIFICANT CHANGE UP (ref 7–14)
BUN SERPL-MCNC: 10 MG/DL — SIGNIFICANT CHANGE UP (ref 7–23)
C PEPTIDE SERPL-MCNC: 3.6 NG/ML — SIGNIFICANT CHANGE UP (ref 1.1–4.4)
CALCIUM SERPL-MCNC: 9 MG/DL — SIGNIFICANT CHANGE UP (ref 8.4–10.5)
CHLORIDE SERPL-SCNC: 96 MMOL/L — LOW (ref 98–107)
CO2 SERPL-SCNC: 24 MMOL/L — SIGNIFICANT CHANGE UP (ref 22–31)
CREAT SERPL-MCNC: 0.55 MG/DL — SIGNIFICANT CHANGE UP (ref 0.5–1.3)
GLUCOSE BLDC GLUCOMTR-MCNC: 295 MG/DL — HIGH (ref 70–99)
GLUCOSE SERPL-MCNC: 177 MG/DL — HIGH (ref 70–99)
HCT VFR BLD CALC: 31 % — LOW (ref 34.5–45)
HGB BLD-MCNC: 9.4 G/DL — LOW (ref 11.5–15.5)
MAGNESIUM SERPL-MCNC: 1.7 MG/DL — SIGNIFICANT CHANGE UP (ref 1.6–2.6)
MCHC RBC-ENTMCNC: 27.2 PG — SIGNIFICANT CHANGE UP (ref 27–34)
MCHC RBC-ENTMCNC: 30.3 % — LOW (ref 32–36)
MCV RBC AUTO: 89.9 FL — SIGNIFICANT CHANGE UP (ref 80–100)
NRBC # FLD: 0 K/UL — SIGNIFICANT CHANGE UP (ref 0–0)
PHOSPHATE SERPL-MCNC: 3.3 MG/DL — SIGNIFICANT CHANGE UP (ref 2.5–4.5)
PLATELET # BLD AUTO: 446 K/UL — HIGH (ref 150–400)
PMV BLD: 8.8 FL — SIGNIFICANT CHANGE UP (ref 7–13)
POTASSIUM SERPL-MCNC: 4 MMOL/L — SIGNIFICANT CHANGE UP (ref 3.5–5.3)
POTASSIUM SERPL-SCNC: 4 MMOL/L — SIGNIFICANT CHANGE UP (ref 3.5–5.3)
RBC # BLD: 3.45 M/UL — LOW (ref 3.8–5.2)
RBC # FLD: 17.9 % — HIGH (ref 10.3–14.5)
SODIUM SERPL-SCNC: 133 MMOL/L — LOW (ref 135–145)
WBC # BLD: 5.53 K/UL — SIGNIFICANT CHANGE UP (ref 3.8–10.5)
WBC # FLD AUTO: 5.53 K/UL — SIGNIFICANT CHANGE UP (ref 3.8–10.5)

## 2019-06-18 PROCEDURE — 99232 SBSQ HOSP IP/OBS MODERATE 35: CPT

## 2019-06-18 PROCEDURE — 99233 SBSQ HOSP IP/OBS HIGH 50: CPT

## 2019-06-18 PROCEDURE — 99239 HOSP IP/OBS DSCHRG MGMT >30: CPT | Mod: GC

## 2019-06-18 PROCEDURE — 90792 PSYCH DIAG EVAL W/MED SRVCS: CPT

## 2019-06-18 RX ORDER — METOPROLOL TARTRATE 50 MG
50 TABLET ORAL
Refills: 0 | Status: DISCONTINUED | OUTPATIENT
Start: 2019-06-18 | End: 2019-06-19

## 2019-06-18 RX ORDER — RISPERIDONE 4 MG/1
0.5 TABLET ORAL EVERY 12 HOURS
Refills: 0 | Status: DISCONTINUED | OUTPATIENT
Start: 2019-06-18 | End: 2019-06-18

## 2019-06-18 RX ORDER — AMLODIPINE BESYLATE 2.5 MG/1
5 TABLET ORAL DAILY
Refills: 0 | Status: DISCONTINUED | OUTPATIENT
Start: 2019-06-18 | End: 2019-06-19

## 2019-06-18 RX ORDER — HALOPERIDOL DECANOATE 100 MG/ML
1 INJECTION INTRAMUSCULAR
Qty: 0 | Refills: 0 | DISCHARGE

## 2019-06-18 RX ORDER — METFORMIN HYDROCHLORIDE 850 MG/1
500 TABLET ORAL
Refills: 0 | Status: DISCONTINUED | OUTPATIENT
Start: 2019-06-18 | End: 2019-06-19

## 2019-06-18 RX ORDER — INSULIN LISPRO 100/ML
2 VIAL (ML) SUBCUTANEOUS
Qty: 0 | Refills: 0 | DISCHARGE
Start: 2019-06-18

## 2019-06-18 RX ORDER — ASPIRIN/CALCIUM CARB/MAGNESIUM 324 MG
81 TABLET ORAL DAILY
Refills: 0 | Status: DISCONTINUED | OUTPATIENT
Start: 2019-06-18 | End: 2019-07-02

## 2019-06-18 RX ORDER — LINEZOLID 600 MG/300ML
600 INJECTION, SOLUTION INTRAVENOUS EVERY 12 HOURS
Refills: 0 | Status: COMPLETED | OUTPATIENT
Start: 2019-06-18 | End: 2019-06-24

## 2019-06-18 RX ORDER — INSULIN GLARGINE 100 [IU]/ML
22 INJECTION, SOLUTION SUBCUTANEOUS
Qty: 0 | Refills: 0 | DISCHARGE

## 2019-06-18 RX ORDER — FLUCONAZOLE 150 MG/1
200 TABLET ORAL DAILY
Refills: 0 | Status: COMPLETED | OUTPATIENT
Start: 2019-06-18 | End: 2019-06-24

## 2019-06-18 RX ORDER — LISINOPRIL 2.5 MG/1
30 TABLET ORAL DAILY
Refills: 0 | Status: DISCONTINUED | OUTPATIENT
Start: 2019-06-18 | End: 2019-06-19

## 2019-06-18 RX ORDER — RISPERIDONE 4 MG/1
1 TABLET ORAL
Qty: 0 | Refills: 0 | DISCHARGE
Start: 2019-06-18

## 2019-06-18 RX ORDER — HALOPERIDOL DECANOATE 100 MG/ML
2.5 INJECTION INTRAMUSCULAR EVERY 6 HOURS
Refills: 0 | Status: DISCONTINUED | OUTPATIENT
Start: 2019-06-18 | End: 2019-07-02

## 2019-06-18 RX ORDER — INSULIN GLARGINE 100 [IU]/ML
11 INJECTION, SOLUTION SUBCUTANEOUS AT BEDTIME
Refills: 0 | Status: DISCONTINUED | OUTPATIENT
Start: 2019-06-18 | End: 2019-06-27

## 2019-06-18 RX ORDER — HALOPERIDOL DECANOATE 100 MG/ML
2.5 INJECTION INTRAMUSCULAR ONCE
Refills: 0 | Status: DISCONTINUED | OUTPATIENT
Start: 2019-06-18 | End: 2019-07-02

## 2019-06-18 RX ORDER — BENZTROPINE MESYLATE 1 MG
0.5 TABLET ORAL AT BEDTIME
Refills: 0 | Status: DISCONTINUED | OUTPATIENT
Start: 2019-06-18 | End: 2019-07-02

## 2019-06-18 RX ORDER — RISPERIDONE 4 MG/1
0.5 TABLET ORAL
Refills: 0 | Status: DISCONTINUED | OUTPATIENT
Start: 2019-06-18 | End: 2019-06-20

## 2019-06-18 RX ADMIN — ENOXAPARIN SODIUM 40 MILLIGRAM(S): 100 INJECTION SUBCUTANEOUS at 12:55

## 2019-06-18 RX ADMIN — Medication 2 UNIT(S): at 09:28

## 2019-06-18 RX ADMIN — Medication 1: at 09:28

## 2019-06-18 RX ADMIN — Medication 81 MILLIGRAM(S): at 12:55

## 2019-06-18 RX ADMIN — LINEZOLID 600 MILLIGRAM(S): 600 INJECTION, SOLUTION INTRAVENOUS at 06:17

## 2019-06-18 RX ADMIN — Medication 2 UNIT(S): at 12:54

## 2019-06-18 RX ADMIN — Medication 50 MILLIGRAM(S): at 06:17

## 2019-06-18 RX ADMIN — LINEZOLID 600 MILLIGRAM(S): 600 INJECTION, SOLUTION INTRAVENOUS at 22:09

## 2019-06-18 RX ADMIN — Medication 50 MILLIGRAM(S): at 22:09

## 2019-06-18 RX ADMIN — RISPERIDONE 0.5 MILLIGRAM(S): 4 TABLET ORAL at 16:18

## 2019-06-18 RX ADMIN — LISINOPRIL 30 MILLIGRAM(S): 2.5 TABLET ORAL at 06:17

## 2019-06-18 RX ADMIN — AMLODIPINE BESYLATE 5 MILLIGRAM(S): 2.5 TABLET ORAL at 06:17

## 2019-06-18 RX ADMIN — INSULIN GLARGINE 11 UNIT(S): 100 INJECTION, SOLUTION SUBCUTANEOUS at 22:04

## 2019-06-18 RX ADMIN — Medication 0.5 MILLIGRAM(S): at 22:08

## 2019-06-18 RX ADMIN — RISPERIDONE 0.5 MILLIGRAM(S): 4 TABLET ORAL at 22:10

## 2019-06-18 RX ADMIN — FLUCONAZOLE 200 MILLIGRAM(S): 150 TABLET ORAL at 12:55

## 2019-06-18 RX ADMIN — Medication 2: at 12:54

## 2019-06-18 RX ADMIN — METFORMIN HYDROCHLORIDE 500 MILLIGRAM(S): 850 TABLET ORAL at 22:08

## 2019-06-18 NOTE — PROGRESS NOTE ADULT - PROBLEM SELECTOR PLAN 3
- previously on sulfonylurea, insulin, metformin with hypoglycemic episode  - sulfonylurea d/albertina  - hypoglycemia management as above  - endocrinology consulted for recurrent hypoglycemia

## 2019-06-18 NOTE — PHYSICAL THERAPY INITIAL EVALUATION ADULT - MANUAL MUSCLE TESTING RESULTS, REHAB EVAL
Per verbal order from Micheline Gomes, patient to be called in a couple days to inquire how he is doing. Bilateral UE muscle strength grossly 3/5 Throughout; Bilateral LE muscle strength grossly 3/5 Throughout.

## 2019-06-18 NOTE — PROGRESS NOTE BEHAVIORAL HEALTH - SUMMARY
The patient is a 69 year old woman with a history of DM2 c/b neuropathy with recent transmetatarsal amputation, HTN, has a chronic psychiatric history notable for Chronic Schizophrenia, has a history of prior inpatient psychiatric admissions- last admitted one year ago at [facility], no history of SA, no h/o aggression, presenting with metabolic encephalopathy 2/2 hypoglycemia in the setting of accidental insulin overdose. Psychiatry consultation has been requested to assess ongoing psychosis which could be contributing to her noncompliance with medical medications. No behavioral issues, calm, cooperative. The patient has a history of chronic schizophrenia, noncompliant with f.u and inconsistent in compliance with medical+ psychiatric medications. Has paranoia, and intermittent AH/VH. No history of aggression.   6/18 - patient with worsening psychosis and refusing Haldol medication.    Recommendations  - Routine observation  - Collaborate care with family  - Discontinue Haldol  - Start Seroquel 0.5mg BID, patient and daughter in agreement.  - Continue Cogentin.   - plan to transfer to [facility] when bed is available    Risk Assessment has risks- older woman, chronic schizophrenia, noncomp The patient is a 69 year old woman with a history of DM2 c/b neuropathy with recent transmetatarsal amputation, HTN, has a chronic psychiatric history notable for Chronic Schizophrenia, has a history of prior inpatient psychiatric admissions- last admitted one year ago at [facility], no history of SA, no h/o aggression, presenting with metabolic encephalopathy 2/2 hypoglycemia in the setting of accidental insulin overdose. Psychiatry consultation has been requested to assess ongoing psychosis which could be contributing to her noncompliance with medical medications. No behavioral issues, calm, cooperative. The patient has a history of chronic schizophrenia, noncompliant with f.u and inconsistent in compliance with medical+ psychiatric medications. Has paranoia, and intermittent AH/VH. No history of aggression.   6/18 - patient with worsening psychosis and refusing Haldol medication.    Recommendations  - Routine observation  - Collaborate care with family  - Discontinue Haldol  - Start Risperidone 0.5mg BID, patient and daughter in agreement.  - Continue Cogentin.   - plan to transfer to [facility] when bed is available    Risk Assessment has risks- older woman, chronic schizophrenia, noncomp The patient is a 69 year old woman with a history of DM2 c/b neuropathy with recent transmetatarsal amputation, HTN, has a chronic psychiatric history notable for Chronic Schizophrenia, has a history of prior inpatient psychiatric admissions- last admitted one year ago at [facility], no history of SA, no h/o aggression, presenting with metabolic encephalopathy 2/2 hypoglycemia in the setting of accidental insulin overdose. Psychiatry consultation has been requested to assess ongoing psychosis which could be contributing to her noncompliance with medical medications. No behavioral issues, calm, cooperative. The patient has a history of chronic schizophrenia, noncompliant with f.u and inconsistent in compliance with medical+ psychiatric medications. Has paranoia, and intermittent AH/VH. No history of aggression.   6/18 - patient with worsening psychosis and refusing Haldol medication.    Recommendations  - Routine observation  - Collaborate care with family  - Discontinue Haldol as patient associates this medication to sedation.   - Start Risperidone 0.5mg BID PO, patient and daughter in agreement. Would benefit from Invega ALBERTO prior to d/c from [facility]  - Continue Cogentin.   - plan to transfer to [facility] when bed is available    Risk Assessment has risks- older woman, chronic schizophrenia, noncomp

## 2019-06-18 NOTE — PROGRESS NOTE ADULT - ATTENDING COMMENTS
Agree with plan as outlined.
Patient glucose is improved.  Large proponent of hyopglycemic episodes is safety of medication administration due to patient's schitophrenia. Patient believes her toes will grow back with balms and salves.  Per patient's family she often does not let them help with insulin dosing. Appreciate psych input, patient needs inpatient psych to better control schitzophrenia symptoms. To go to [facility] today as medically stable and bed available.   D/C planning 32 minutes. Should follow up with endocrine as an outpatinet after [facility].
Patient seen and examined, d/w Dr. Stevens agree with above with following additons.     Metabolic encephalopathy 2/2 hypoglycemia, likely 2/2 excessive insulin administration (per team d/w daughter, patient was injecting twice what she was supposed to..., sulfonylureas were discontinued). FS appears to be holding off dextrose gtt, will resume insulin regimen at lower dose, Patient/family will need diabetic education/teaching on insulin administration/glucose monitoring, daughter also told team she will lock up the insulin and help with administration (she is diabetic herself). Will see if can also arrange close otpt f/u (PMD/endo). OM R foot s/p TMA, c/w PO abx to complete course on 6/24, c/w wound care. Schizophrenia c/w psych meds.
Agree with above.   This is the second admission for Ms. Salmon for hypoglycemia.  Unsure if prior event was in setting of hypoglycemia or increased lantus dosing as patient states last time she took 40 units of lantus prior to that hypoglycemic episode and this time she took 30 units. Patient states she has difficulty seeing what dose she is giving on the insulin, but was unable to let me know whether she takes the syringe or pen form.     #DM type 2 insulin dependent complicated by hypoglycemia  -due to concern about recurrent episodes regardless of what is sent to pharmacy, had endocrine consult to see if possible to send out on all oral agents, vs ability to have closer follow up with patient.  -continue with current course at this time.    DISPO: will see if family able to assist with insulin administration.  Likely D/C later today or tomorrow once safe dispo set up re: diabetic regimen.  D/C planning 33 minutes.   #Osteomyelitis  -continue with Abx, outpatient follow up with podiatry.  -PT and wound care
c/o one episode of vomiting, ear pain, temp of 99 and decreased PO intake/urine output for one day. benign exam, likely viral, normal VSS. zofran, po fluids, dc home when voids. This patient has a viral illness and does not need an antibiotic for the illness and giving antibiotics may potentially lead to unwanted adverse outcomes This has been explained to the patients parent/guardian. Discharge discussed with family, agreeable with plan.

## 2019-06-18 NOTE — PROGRESS NOTE ADULT - PROBLEM SELECTOR PLAN 4
- on haldol and benztropine at home  - repeat ekg 475  - psych recs appreciated, haldol increased to 7.5mg and pt's family will seek outpatient followup - on haldol and benztropine at home  - repeat ekg 475  - psych recs appreciated, haldol increased to 7.5mg but patient refused. Psych has discussed with HCP and given the patient's recurrent admissions to the hospital for med noncompliance in the setting of her uncontrolled mental health, the decision was made to involuntarily admit patient to [facility] for management of her schizophrenia.

## 2019-06-18 NOTE — PROGRESS NOTE ADULT - PROBLEM SELECTOR PROBLEM 7
Other osteomyelitis of right foot

## 2019-06-18 NOTE — PHYSICAL THERAPY INITIAL EVALUATION ADULT - PERTINENT HX OF CURRENT PROBLEM, REHAB EVAL
Pt. is a 69 year old female admitted secondary to hypoglycemia. PMH: schizophrenia, Diabetes, HTN, transmetatarsal amputation.

## 2019-06-18 NOTE — PROGRESS NOTE ADULT - PROBLEM SELECTOR PLAN 2
- likely 2/2 insulin and decreased PO intake  - management as above
- likely 2/2 insulin and decreased PO intake  - restarted lantus last night, gave 11U qhs. fsg 325 last night, 148 this am. will f/u fsg today.  - management as above
- likely 2/2 insulin and decreased PO intake  - c/w lantus 11U qhs.  - endocrine recs appreciated, will c/w 11U lantus and adding premeal 2/2/2  - f/u c-[peptide to assess if pt can be dc on non-insulin regimen  - dc on low dose basal insulin and metformin  - dietician consult for hypoglycemia

## 2019-06-18 NOTE — PROGRESS NOTE BEHAVIORAL HEALTH - NSBHADMITCOUNSEL_PSY_A_CORE
risks and benefits of treatment options/instructions for management, treatment and follow up/risk factor reduction/importance of adherence to chosen treatment/client/family/caregiver education

## 2019-06-18 NOTE — PROGRESS NOTE ADULT - SUBJECTIVE AND OBJECTIVE BOX
Chief Complaint: DM 2    History: Patient seen at bedside. Reports she is feeling well, would like to go home. Patient tolerating meals, reports she ate a piece of bread and coffee for breakfast. States appetite is improving from admission. No n/v, no s/s of hypoglycemia.        MEDICATIONS  (STANDING):  amLODIPine   Tablet 5 milliGRAM(s) Oral daily  aspirin enteric coated 81 milliGRAM(s) Oral daily  benztropine 0.5 milliGRAM(s) Oral at bedtime  dextrose 5%. 1000 milliLiter(s) (50 mL/Hr) IV Continuous <Continuous>  dextrose 50% Injectable 12.5 Gram(s) IV Push once  dextrose 50% Injectable 25 Gram(s) IV Push once  dextrose 50% Injectable 25 Gram(s) IV Push once  enoxaparin Injectable 40 milliGRAM(s) SubCutaneous daily  fluconAZOLE   Tablet 200 milliGRAM(s) Oral daily  insulin glargine Injectable (LANTUS) 11 Unit(s) SubCutaneous at bedtime  insulin lispro (HumaLOG) corrective regimen sliding scale   SubCutaneous three times a day before meals  insulin lispro (HumaLOG) corrective regimen sliding scale   SubCutaneous at bedtime  insulin lispro Injectable (HumaLOG) 2 Unit(s) SubCutaneous three times a day before meals  levoFLOXacin  Tablet 750 milliGRAM(s) Oral every 24 hours  linezolid    Tablet 600 milliGRAM(s) Oral every 12 hours  lisinopril 30 milliGRAM(s) Oral daily  metoprolol tartrate 50 milliGRAM(s) Oral two times a day  risperiDONE   Tablet 0.5 milliGRAM(s) Oral every 12 hours    MEDICATIONS  (PRN):  dextrose 40% Gel 15 Gram(s) Oral once PRN Blood Glucose LESS THAN 70 milliGRAM(s)/deciliter  glucagon  Injectable 1 milliGRAM(s) IntraMuscular once PRN Glucose LESS THAN 70 milligrams/deciliter      Allergies    No Known Drug Allergies  sudafed (Hives)    Review of Systems:  Constitutional: No fever  Cardiovascular: No chest pain, palpitations  Respiratory: No SOB, no cough  GI: No nausea, vomiting, abdominal pain      PHYSICAL EXAM:  VITALS: T(C): 36.6 (06-18-19 @ 06:15)  T(F): 97.9 (06-18-19 @ 06:15), Max: 97.9 (06-18-19 @ 06:15)  HR: 81 (06-18-19 @ 06:15) (78 - 81)  BP: 145/76 (06-18-19 @ 06:15) (117/55 - 145/76)  RR:  (18 - 18)  SpO2:  (100% - 100%)  Wt(kg): --  GENERAL: NAD, well-groomed, well-developed  EYES: No proptosis, no lid lag, anicteric  HEENT:  Atraumatic, Normocephalic, moist mucous membranes  PSYCH: Alert and oriented x 3, mood is calm      CAPILLARY BLOOD GLUCOSE      POCT Blood Glucose.: 162 mg/dL (18 Jun 2019 09:01)  POCT Blood Glucose.: 300 mg/dL (17 Jun 2019 22:07)  POCT Blood Glucose.: 124 mg/dL (17 Jun 2019 18:00)  POCT Blood Glucose.: 236 mg/dL (17 Jun 2019 12:32)      06-18    133<L>  |  96<L>  |  10  ----------------------------<  177<H>  4.0   |  24  |  0.55    EGFR if : 113  EGFR if non : 97    Ca    9.0      06-18  Mg     1.7     06-18  Phos  3.3     06-18    TPro  8.6<H>  /  Alb  3.5  /  TBili  0.3  /  DBili  x   /  AST  17  /  ALT  9   /  AlkPhos  86  06-15          Thyroid Function Tests:  06-15 @ 13:00 TSH 3.44 FreeT4 -- T3 -- Anti TPO -- Anti Thyroglobulin Ab -- TSI --      Hemoglobin A1C, Whole Blood: 7.9 % <H> [4.0 - 5.6] (06-16-19 @ 05:00) Chief Complaint: DM 2    History: Patient seen at bedside. Reports she is feeling well, would like to go home. Patient tolerating meals, reports she ate a piece of bread and coffee for breakfast. States appetite is improving from admission. No n/v, no s/s of hypoglycemia.        MEDICATIONS  (STANDING):  amLODIPine   Tablet 5 milliGRAM(s) Oral daily  aspirin enteric coated 81 milliGRAM(s) Oral daily  benztropine 0.5 milliGRAM(s) Oral at bedtime  dextrose 5%. 1000 milliLiter(s) (50 mL/Hr) IV Continuous <Continuous>  dextrose 50% Injectable 12.5 Gram(s) IV Push once  dextrose 50% Injectable 25 Gram(s) IV Push once  dextrose 50% Injectable 25 Gram(s) IV Push once  enoxaparin Injectable 40 milliGRAM(s) SubCutaneous daily  fluconAZOLE   Tablet 200 milliGRAM(s) Oral daily  insulin glargine Injectable (LANTUS) 11 Unit(s) SubCutaneous at bedtime  insulin lispro (HumaLOG) corrective regimen sliding scale   SubCutaneous three times a day before meals  insulin lispro (HumaLOG) corrective regimen sliding scale   SubCutaneous at bedtime  insulin lispro Injectable (HumaLOG) 2 Unit(s) SubCutaneous three times a day before meals  levoFLOXacin  Tablet 750 milliGRAM(s) Oral every 24 hours  linezolid    Tablet 600 milliGRAM(s) Oral every 12 hours  lisinopril 30 milliGRAM(s) Oral daily  metoprolol tartrate 50 milliGRAM(s) Oral two times a day  risperiDONE   Tablet 0.5 milliGRAM(s) Oral every 12 hours    MEDICATIONS  (PRN):  dextrose 40% Gel 15 Gram(s) Oral once PRN Blood Glucose LESS THAN 70 milliGRAM(s)/deciliter  glucagon  Injectable 1 milliGRAM(s) IntraMuscular once PRN Glucose LESS THAN 70 milligrams/deciliter      Allergies    No Known Drug Allergies  sudafed (Hives)    Review of Systems:  Constitutional: No fever  Cardiovascular: No chest pain, palpitations  Respiratory: No SOB, no cough  GI: No nausea, vomiting, abdominal pain      PHYSICAL EXAM:  VITALS: T(C): 36.6 (06-18-19 @ 06:15)  T(F): 97.9 (06-18-19 @ 06:15), Max: 97.9 (06-18-19 @ 06:15)  HR: 81 (06-18-19 @ 06:15) (78 - 81)  BP: 145/76 (06-18-19 @ 06:15) (117/55 - 145/76)  RR:  (18 - 18)  SpO2:  (100% - 100%)  Wt(kg): --  GENERAL: NAD, well-groomed, well-developed  EYES: No proptosis, no lid lag, anicteric  HEENT:  Atraumatic, Normocephalic, moist mucous membranes  PSYCH: Alert and oriented x 3, mood is calm. Noted with ? AV/VH today, patient noted to be having conversation while no one present in room     CAPILLARY BLOOD GLUCOSE    POCT Blood Glucose.: 162 mg/dL (18 Jun 2019 09:01)  POCT Blood Glucose.: 300 mg/dL (17 Jun 2019 22:07)  POCT Blood Glucose.: 124 mg/dL (17 Jun 2019 18:00)  POCT Blood Glucose.: 236 mg/dL (17 Jun 2019 12:32)      06-18    133<L>  |  96<L>  |  10  ----------------------------<  177<H>  4.0   |  24  |  0.55    EGFR if : 113  EGFR if non : 97    Ca    9.0      06-18  Mg     1.7     06-18  Phos  3.3     06-18    TPro  8.6<H>  /  Alb  3.5  /  TBili  0.3  /  DBili  x   /  AST  17  /  ALT  9   /  AlkPhos  86  06-15          Thyroid Function Tests:  06-15 @ 13:00 TSH 3.44 FreeT4 -- T3 -- Anti TPO -- Anti Thyroglobulin Ab -- TSI --      Hemoglobin A1C, Whole Blood: 7.9 % <H> [4.0 - 5.6] (06-16-19 @ 05:00)

## 2019-06-18 NOTE — CONSULT NOTE ADULT - SUBJECTIVE AND OBJECTIVE BOX
Patient is a 69y old  Female who presents with a chief complaint of encephalopathy (18 Jun 2019 08:36)      HPI:  69F with DM2 c/b neuropathy with recent transmetatarsal amputation, schizophrenia, HTN coming to the ED with AMS at home. History largely obtained from daughter at bedside due to patient's underlying schizophrenia. Daughter states patient was in normal state of health yesterday, ate dinner and went to bed. She takes haldol at night per daughter which sometimes will make her more sleepy but usually is tolerated. In the AM she did not wake up at her usual time which family attributed to haldol at first. She continued to sleep and later in the morning family tried to wake her but she was unarousable so they checked her sugar which was 24. They called 911 and patient was given dextrose which resulted in drastic improvement in mental status. Patient was recently hospitalized in [facility] for hypoglycemia and found to have osteomyelitis in right foot with ulcer. Underwent transmetatarsal amputation on 6/6 and was d/c on linezolid, fluconazole and levaquin. She was previously on a sulfonylurea which was d/albertina on admission. She was d/c on Monday 6/10 and has been normal sense however has had decreased PO intake since admission. Daughter at bedside states she is now back to baseline.     In ED, noted to have FS to 70-80s however dropped again to 50s - given dextrose 50 and started on D10 drip. (15 Negro 2019 19:11)      PAST MEDICAL & SURGICAL HISTORY:  Schizophrenia  Hypertension  Diabetes: insulin dependent  S/P appendectomy      MEDICATIONS  (STANDING):  amLODIPine   Tablet 5 milliGRAM(s) Oral daily  aspirin enteric coated 81 milliGRAM(s) Oral daily  benztropine 0.5 milliGRAM(s) Oral at bedtime  dextrose 5%. 1000 milliLiter(s) (50 mL/Hr) IV Continuous <Continuous>  dextrose 50% Injectable 12.5 Gram(s) IV Push once  dextrose 50% Injectable 25 Gram(s) IV Push once  dextrose 50% Injectable 25 Gram(s) IV Push once  enoxaparin Injectable 40 milliGRAM(s) SubCutaneous daily  fluconAZOLE   Tablet 200 milliGRAM(s) Oral daily  insulin glargine Injectable (LANTUS) 11 Unit(s) SubCutaneous at bedtime  insulin lispro (HumaLOG) corrective regimen sliding scale   SubCutaneous three times a day before meals  insulin lispro (HumaLOG) corrective regimen sliding scale   SubCutaneous at bedtime  insulin lispro Injectable (HumaLOG) 2 Unit(s) SubCutaneous three times a day before meals  levoFLOXacin  Tablet 750 milliGRAM(s) Oral every 24 hours  linezolid    Tablet 600 milliGRAM(s) Oral every 12 hours  lisinopril 30 milliGRAM(s) Oral daily  metoprolol tartrate 50 milliGRAM(s) Oral two times a day  risperiDONE   Tablet 0.5 milliGRAM(s) Oral every 12 hours    MEDICATIONS  (PRN):  dextrose 40% Gel 15 Gram(s) Oral once PRN Blood Glucose LESS THAN 70 milliGRAM(s)/deciliter  glucagon  Injectable 1 milliGRAM(s) IntraMuscular once PRN Glucose LESS THAN 70 milligrams/deciliter      Allergies    No Known Drug Allergies  sudafed (Hives)    Intolerances        VITALS:    Vital Signs Last 24 Hrs  T(C): 36.6 (18 Jun 2019 06:15), Max: 36.6 (17 Jun 2019 14:36)  T(F): 97.9 (18 Jun 2019 06:15), Max: 97.9 (18 Jun 2019 06:15)  HR: 81 (18 Jun 2019 06:15) (78 - 81)  BP: 145/76 (18 Jun 2019 06:15) (117/55 - 145/76)  BP(mean): --  RR: 18 (18 Jun 2019 06:15) (18 - 18)  SpO2: 100% (18 Jun 2019 06:15) (100% - 100%)    LABS:                          9.4    5.53  )-----------( 446      ( 18 Jun 2019 05:07 )             31.0       06-18    133<L>  |  96<L>  |  10  ----------------------------<  177<H>  4.0   |  24  |  0.55    Ca    9.0      18 Jun 2019 05:07  Phos  3.3     06-18  Mg     1.7     06-18        CAPILLARY BLOOD GLUCOSE      POCT Blood Glucose.: 162 mg/dL (18 Jun 2019 09:01)  POCT Blood Glucose.: 300 mg/dL (17 Jun 2019 22:07)  POCT Blood Glucose.: 124 mg/dL (17 Jun 2019 18:00)  POCT Blood Glucose.: 236 mg/dL (17 Jun 2019 12:32)          LOWER EXTREMITY PHYSICAL EXAM:  Right foot focused exam:    Vasular: DP/PT 1/4, Temperature gradient warm to warm   Neuro: Epicritic sensation diminished at the level of the ankle  Musculoskeletal/Ortho: No pain on palpation on any pedal site  Skin:  Wound #1: Right foot  Location: TMA site  Size: 4.2 cm  Depth: 0 cm  Wound bed: closed wound with no ulceration or signs of dehiscence   Drainage: None  Odor: None  Periwound: Consistent with surgery; hyperkeratotic tissues adolfo-incison  Etiology: Surgical    RADIOLOGY & ADDITIONAL STUDIES:

## 2019-06-18 NOTE — PROGRESS NOTE ADULT - PROBLEM SELECTOR PLAN 7
- dx at [facility] last week s/p right transmetatarsal amputation  - c/w linezolid, fluconazole, levaquin - d/c with 14 day course to end 6/24  - pending pt recs  - wound care requesting podiatry input for wound care recs  - podiatry consulted - dx at [facility] last week s/p right transmetatarsal amputation  - c/w linezolid, fluconazole, levaquin - d/c with 14 day course to end 6/24  - podiatry rec to continue current dressing changes as wound is well healed.  - wound care requesting podiatry input for wound care recs  - podiatry consulted

## 2019-06-18 NOTE — CONSULT NOTE ADULT - ASSESSMENT
68 yo female s/p TMA Right foot (DOS: May 2019 at [facility]), closed, non infected    - Patient was examined and evaluated  - Surgical site closed consistent with surgical period; no open ulcerations nor dehiscence at surgical site  - Surgical site cleaned, dressed with Betadine, 4x4 gauze, ABD and Varinder.  - Recommend keep dressing dry, clean and intact until follow up  - No podsurg intervention  - Patient stable for discharge from podsurg standpoint  - Patient can follow up with Dr. Workman [phone]; [address], [place] [address]  - Discussed with attending.

## 2019-06-18 NOTE — PROGRESS NOTE ADULT - PROBLEM SELECTOR PLAN 8
- diet - cc  - dvt - lovenox  - dispo - pending PT josé miguel, needs pt and family education on DM - diet - cc  - dvt - lovenox  - dispo - [facility]

## 2019-06-18 NOTE — PROGRESS NOTE ADULT - PROBLEM SELECTOR PLAN 1
- 2/2 hypoglycemia  - likely 2/2 decreased PO intake, excessive insulin administration  - previous episode was in setting of sulfonylurea use and osteomyelitis   - per daughter, no longer on sulfonylurea  - last episode likely in setting of infection, currently afebrile, no leukocytosis and no complaints of infectious sources, right foot stump healing  - a1c 7.9  - CTH with chronic changes, no acute pathology

## 2019-06-18 NOTE — PHYSICAL THERAPY INITIAL EVALUATION ADULT - ASR EQUIP NEEDS DISCH PT EVAL
no necessary equipment needed at this time secondary to pt. reporting owning a rolling walker at home

## 2019-06-18 NOTE — DISCHARGE NOTE PROVIDER - PROVIDER TOKENS
FREE:[LAST:[Dr. Workman],PHONE:[phone]],FAX:[(   )    -],ADDRESS:[[address] [place] [address]]]

## 2019-06-18 NOTE — DISCHARGE NOTE PROVIDER - NSDCCPCAREPLAN_GEN_ALL_CORE_FT
PRINCIPAL DISCHARGE DIAGNOSIS  Diagnosis: Hypoglycemia  Assessment and Plan of Treatment: You were found to have a low sugar in the setting of using too much of your insulin.  Please continue insulin at the dose prescribed.  Please follow up with Endocrinology after you return home.  The number for their clinic is [phone].  Please follow up with Podiatry for your foot.  Your podiatrist is Dr. Workman [phone]; [address] [facility], [place] [address].  Please continue your antibiotics through 6/24/19. PRINCIPAL DISCHARGE DIAGNOSIS  Diagnosis: Hypoglycemia  Assessment and Plan of Treatment: You were found to have a low sugar in the setting of using too much of your insulin.  Please continue insulin at the dose prescribed.  Please follow up with Endocrinology after you return home.  The number for their clinic is [phone].  Please follow up with Podiatry for your foot.  Your podiatrist is Dr. Workman [phone]; [address] [facility], [place] [address].  Please continue your antibiotics through 6/24/19.      SECONDARY DISCHARGE DIAGNOSES  Diagnosis: Psychosis, schizophrenia, simple  Assessment and Plan of Treatment: Please follow up with Psychiatry after your hospitalization.  Your medications were changed by Psychiatry.    Diagnosis: Type 2 diabetes mellitus with other circulatory complication, with long-term current use of insulin  Assessment and Plan of Treatment: Please follow up with Podiatry for your foot.  Your podiatrist is Dr. Workman [phone]; [address] [facility], [place] [address]. PRINCIPAL DISCHARGE DIAGNOSIS  Diagnosis: Hypoglycemia  Assessment and Plan of Treatment: You were found to have a low sugar in the setting of using too much of your insulin.  Please continue insulin at the dose prescribed.  Please follow up with Endocrinology after you return home.  The number for their clinic is [phone].  Please follow up with Podiatry for your foot.  Your podiatrist is Dr. Workman [phone]; [address] [facility], [place] [address].  Please continue your antibiotics through 6/24/19.      SECONDARY DISCHARGE DIAGNOSES  Diagnosis: Psychosis, schizophrenia, simple  Assessment and Plan of Treatment: Please follow up with Psychiatry after your hospitalization.  Your medications were changed by Psychiatry.    Diagnosis: Other osteomyelitis of right foot  Assessment and Plan of Treatment: Please follow up with Podiatry for your foot.  Your podiatrist is Dr. Workman [phone]; [address] [facility], [place] [address].  WOUND CARE RECS: Recommend keep dressing dry, clean and intact until follow up.    Diagnosis: Type 2 diabetes mellitus with other circulatory complication, with long-term current use of insulin  Assessment and Plan of Treatment: Please follow up with Podiatry for your foot.  Your podiatrist is Dr. Workman [phone]; [address] [facility], [place] [address].

## 2019-06-18 NOTE — PROGRESS NOTE BEHAVIORAL HEALTH - NSBHCHARTREVIEWVS_PSY_A_CORE FT
Vital Signs Last 24 Hrs  T(C): 36.6 (18 Jun 2019 06:15), Max: 36.6 (17 Jun 2019 14:36)  T(F): 97.9 (18 Jun 2019 06:15), Max: 97.9 (18 Jun 2019 06:15)  HR: 81 (18 Jun 2019 06:15) (78 - 81)  BP: 145/76 (18 Jun 2019 06:15) (117/55 - 145/76)  BP(mean): --  RR: 18 (18 Jun 2019 06:15) (18 - 18)  SpO2: 100% (18 Jun 2019 06:15) (100% - 100%)

## 2019-06-18 NOTE — PROGRESS NOTE BEHAVIORAL HEALTH - NSBHCHARTREVIEWLAB_PSY_A_CORE FT
9.4    5.53  )-----------( 446      ( 18 Jun 2019 05:07 )             31.0   06-18    133<L>  |  96<L>  |  10  ----------------------------<  177<H>  4.0   |  24  |  0.55    Ca    9.0      18 Jun 2019 05:07  Phos  3.3     06-18  Mg     1.7     06-18

## 2019-06-18 NOTE — PROGRESS NOTE ADULT - SUBJECTIVE AND OBJECTIVE BOX
=========================================  CONTACT INFO  Siddharth Stevens M.D., PGY-1  Pager: [facility]- [phone]    Mon-Fri: pager covered by day team 7am-7pm;   Sa/Sun: see chart, primary physician assigned available 7am-12pm  Sat/[place] Cross Coverage 12pm-7pm: [facility] - pager forwarded to covering Resident  For Night coverage 7pm-7am:  [facility]- page [phone] for Red, [phone] for Blue  =========================================    Patient is a 69y old  Female who presents with a chief complaint of encephalopathy (17 Jun 2019 18:45)      SUBJECTIVE / OVERNIGHT EVENTS:  Pt refused haldol last night because she did not want to sleep.     She reports:  [  ] CP  [  ] SOB  [  ] Fever  [  ] N /  [  ] V  [  ] Diarrhea  [X] None of the above    Other Review of Systems Negative.    MEDICATIONS  (STANDING):  amLODIPine   Tablet 5 milliGRAM(s) Oral daily  aspirin enteric coated 81 milliGRAM(s) Oral daily  benztropine 0.5 milliGRAM(s) Oral at bedtime  dextrose 5%. 1000 milliLiter(s) (50 mL/Hr) IV Continuous <Continuous>  dextrose 50% Injectable 12.5 Gram(s) IV Push once  dextrose 50% Injectable 25 Gram(s) IV Push once  dextrose 50% Injectable 25 Gram(s) IV Push once  enoxaparin Injectable 40 milliGRAM(s) SubCutaneous daily  fluconAZOLE   Tablet 200 milliGRAM(s) Oral daily  haloperidol     Tablet 7.5 milliGRAM(s) Oral at bedtime  insulin glargine Injectable (LANTUS) 11 Unit(s) SubCutaneous at bedtime  insulin lispro (HumaLOG) corrective regimen sliding scale   SubCutaneous three times a day before meals  insulin lispro (HumaLOG) corrective regimen sliding scale   SubCutaneous at bedtime  insulin lispro Injectable (HumaLOG) 2 Unit(s) SubCutaneous three times a day before meals  levoFLOXacin  Tablet 750 milliGRAM(s) Oral every 24 hours  linezolid    Tablet 600 milliGRAM(s) Oral every 12 hours  lisinopril 30 milliGRAM(s) Oral daily  metoprolol tartrate 50 milliGRAM(s) Oral two times a day    MEDICATIONS  (PRN):  dextrose 40% Gel 15 Gram(s) Oral once PRN Blood Glucose LESS THAN 70 milliGRAM(s)/deciliter  glucagon  Injectable 1 milliGRAM(s) IntraMuscular once PRN Glucose LESS THAN 70 milligrams/deciliter      OBJECTIVE:    Vital Signs Last 24 Hrs  T(C): 36.6 (18 Jun 2019 06:15), Max: 36.6 (17 Jun 2019 14:36)  T(F): 97.9 (18 Jun 2019 06:15), Max: 97.9 (18 Jun 2019 06:15)  HR: 81 (18 Jun 2019 06:15) (78 - 81)  BP: 145/76 (18 Jun 2019 06:15) (117/55 - 145/76)  BP(mean): --  RR: 18 (18 Jun 2019 06:15) (18 - 18)  SpO2: 100% (18 Jun 2019 06:15) (100% - 100%)     CAPILLARY BLOOD GLUCOSE      POCT Blood Glucose.: 300 mg/dL (17 Jun 2019 22:07)  POCT Blood Glucose.: 124 mg/dL (17 Jun 2019 18:00)  POCT Blood Glucose.: 236 mg/dL (17 Jun 2019 12:32)  POCT Blood Glucose.: 148 mg/dL (17 Jun 2019 08:44)    I&O's Summary      PHYSICAL EXAM:  GENERAL: NAD, sitting up in bed, consistently talking with tangents  	HEAD:  Atraumatic, Normocephalic, poor dentition with missing teeth  	EYES: EOMI, PERRLA, conjunctiva and sclera clear  	NECK: Supple  	CHEST/LUNG: Clear to auscultation bilaterally; No wheezes or rales  	HEART: tachycardic, regular rhythm; No murmurs, rubs, or gallops  	ABDOMEN: Soft, Nontender, Nondistended; Bowel sounds present  	EXTREMITIES:  transmetatarsal amputation on right foot, no active bleeding or pus, bandages clean and dry  	PSYCH: AAOx3, persistent talking as above however able to answer questions  NEUROLOGY: non-focal    LABS:                        9.4    5.53  )-----------( 446      ( 18 Jun 2019 05:07 )             31.0     Auto Eosinophil # x     / Auto Eosinophil % x     / Auto Neutrophil # x     / Auto Neutrophil % x     / BANDS % x                            8.3    4.98  )-----------( 405      ( 17 Jun 2019 07:52 )             27.5     Auto Eosinophil # x     / Auto Eosinophil % x     / Auto Neutrophil # x     / Auto Neutrophil % x     / BANDS % x        06-18    133<L>  |  96<L>  |  10  ----------------------------<  177<H>  4.0   |  24  |  0.55  06-17    137  |  99  |  10  ----------------------------<  165<H>  4.5   |  28  |  0.67    Ca    9.0      18 Jun 2019 05:07  Mg     1.7     06-18  Phos  3.3     06-18                  ABG:     VBG:     Microbiology:        Care Discussed with Consultants/Other Providers:    RADIOLOGY & ADDITIONAL TESTS:  (Imaging Personally Reviewed) =========================================  CONTACT INFO  Siddharth Stevens M.D., PGY-1  Pager: [facility]- [phone]    Mon-Fri: pager covered by day team 7am-7pm;   Sa/Sun: see chart, primary physician assigned available 7am-12pm  Sat/[place] Cross Coverage 12pm-7pm: [facility] - pager forwarded to covering Resident  For Night coverage 7pm-7am:  [facility]- page [phone] for Red, [phone] for Blue  =========================================    Patient is a 69y old  Female who presents with a chief complaint of encephalopathy (17 Jun 2019 18:45)      SUBJECTIVE / OVERNIGHT EVENTS:  Pt refused haldol last night because she did not want to sleep. Today she denies chest pain, sob, abd pain, and right foot pain. Per RN, patient was endorsing strange thoughts involving wires through her heart.     She reports:  [  ] CP  [  ] SOB  [  ] Fever  [  ] N /  [  ] V  [  ] Diarrhea  [X] None of the above    Other Review of Systems Negative.    MEDICATIONS  (STANDING):  amLODIPine   Tablet 5 milliGRAM(s) Oral daily  aspirin enteric coated 81 milliGRAM(s) Oral daily  benztropine 0.5 milliGRAM(s) Oral at bedtime  dextrose 5%. 1000 milliLiter(s) (50 mL/Hr) IV Continuous <Continuous>  dextrose 50% Injectable 12.5 Gram(s) IV Push once  dextrose 50% Injectable 25 Gram(s) IV Push once  dextrose 50% Injectable 25 Gram(s) IV Push once  enoxaparin Injectable 40 milliGRAM(s) SubCutaneous daily  fluconAZOLE   Tablet 200 milliGRAM(s) Oral daily  haloperidol     Tablet 7.5 milliGRAM(s) Oral at bedtime  insulin glargine Injectable (LANTUS) 11 Unit(s) SubCutaneous at bedtime  insulin lispro (HumaLOG) corrective regimen sliding scale   SubCutaneous three times a day before meals  insulin lispro (HumaLOG) corrective regimen sliding scale   SubCutaneous at bedtime  insulin lispro Injectable (HumaLOG) 2 Unit(s) SubCutaneous three times a day before meals  levoFLOXacin  Tablet 750 milliGRAM(s) Oral every 24 hours  linezolid    Tablet 600 milliGRAM(s) Oral every 12 hours  lisinopril 30 milliGRAM(s) Oral daily  metoprolol tartrate 50 milliGRAM(s) Oral two times a day    MEDICATIONS  (PRN):  dextrose 40% Gel 15 Gram(s) Oral once PRN Blood Glucose LESS THAN 70 milliGRAM(s)/deciliter  glucagon  Injectable 1 milliGRAM(s) IntraMuscular once PRN Glucose LESS THAN 70 milligrams/deciliter      OBJECTIVE:    Vital Signs Last 24 Hrs  T(C): 36.6 (18 Jun 2019 06:15), Max: 36.6 (17 Jun 2019 14:36)  T(F): 97.9 (18 Jun 2019 06:15), Max: 97.9 (18 Jun 2019 06:15)  HR: 81 (18 Jun 2019 06:15) (78 - 81)  BP: 145/76 (18 Jun 2019 06:15) (117/55 - 145/76)  BP(mean): --  RR: 18 (18 Jun 2019 06:15) (18 - 18)  SpO2: 100% (18 Jun 2019 06:15) (100% - 100%)     CAPILLARY BLOOD GLUCOSE      POCT Blood Glucose.: 300 mg/dL (17 Jun 2019 22:07)  POCT Blood Glucose.: 124 mg/dL (17 Jun 2019 18:00)  POCT Blood Glucose.: 236 mg/dL (17 Jun 2019 12:32)  POCT Blood Glucose.: 148 mg/dL (17 Jun 2019 08:44)    I&O's Summary      PHYSICAL EXAM:  GENERAL: NAD, sitting up in bed, consistently talking with tangents  	HEAD:  Atraumatic, Normocephalic, poor dentition with missing teeth  	EYES: EOMI, PERRLA, conjunctiva and sclera clear  	NECK: Supple  	CHEST/LUNG: Clear to auscultation bilaterally; No wheezes or rales  	HEART: tachycardic, regular rhythm; No murmurs, rubs, or gallops  	ABDOMEN: Soft, Nontender, Nondistended; Bowel sounds present  	EXTREMITIES:  transmetatarsal amputation on right foot, no active bleeding or pus, bandages clean and dry  	PSYCH: AAOx3, persistent talking as above however able to answer questions  NEUROLOGY: non-focal    LABS:                        9.4    5.53  )-----------( 446      ( 18 Jun 2019 05:07 )             31.0     Auto Eosinophil # x     / Auto Eosinophil % x     / Auto Neutrophil # x     / Auto Neutrophil % x     / BANDS % x                            8.3    4.98  )-----------( 405      ( 17 Jun 2019 07:52 )             27.5     Auto Eosinophil # x     / Auto Eosinophil % x     / Auto Neutrophil # x     / Auto Neutrophil % x     / BANDS % x        06-18    133<L>  |  96<L>  |  10  ----------------------------<  177<H>  4.0   |  24  |  0.55  06-17    137  |  99  |  10  ----------------------------<  165<H>  4.5   |  28  |  0.67    Ca    9.0      18 Jun 2019 05:07  Mg     1.7     06-18  Phos  3.3     06-18                  ABG:     VBG:     Microbiology:        Care Discussed with Consultants/Other Providers:    RADIOLOGY & ADDITIONAL TESTS:  (Imaging Personally Reviewed)

## 2019-06-18 NOTE — PROGRESS NOTE BEHAVIORAL HEALTH - NSBHFUPINTERVALHXFT_PSY_A_CORE
Patient seen in follow up, chart was reviewed. Patient had no acute overnight events but is resfusing haldol. Patient is A&Ox3 and responding to questions cooperatively. Patient states her mood is alright this morning. Patient is sleeping and eating well. Patient admits to hearing voices that are threatening to attack her family. She also hears her family and children screaming. Patient admits to ocasionally seeing "images," including stars and animals. To feel better, patient reports that she goes to the sink and washes her face and hair. Patient does not think anyone in the hospital is trying to hurt her but she states things were "suspicious" at [facility]. She states she doesn't think her family is trying to her hurt but she thinks her husbands family is trying to hurt her. Patient states she does not want to take Haldol because 5mg is too high of a dose. She reports that the reason she was brought to the hospital is due to the Haldol "knocking her out." She also thinks the Haldol may affect her child. She reports that she has been pregnant for years with multiple fetuses. She says she feels them moving and that they talk to her occasionally, saying "I want my daddy." She reports "Haldol makes me bleed." Patient is agreeable to trying Seroquel instead of Haldol. Denies SI/HI/PI. Patient seen in follow up, chart was reviewed. Patient had no acute overnight events but is resfusing haldol. Patient is A&Ox3 and responding to questions cooperatively. Patient states her mood is alright this morning. Patient is sleeping and eating well. Patient admits to hearing voices that are threatening to attack her family. She also hears her family and children screaming. Patient admits to ocasionally seeing "images," including stars and animals. To feel better, patient reports that she goes to the sink and washes her face and hair. Patient does not think anyone in the hospital is trying to hurt her but she states things were "suspicious" at [facility]. She states she doesn't think her family is trying to her hurt but she thinks her husbands family is trying to hurt her. Patient states she does not want to take Haldol because 5mg is too high of a dose. She reports that the reason she was brought to the hospital is due to the Haldol "knocking her out." She also thinks the Haldol may affect her child. She reports that she has been pregnant for years with multiple fetuses. She says she feels them moving and that they talk to her occasionally, saying "I want my daddy." She reports "Haldol makes me bleed." Patient is agreeable to trying Risperidone instead of Haldol. Denies SI/HI/PI. Patient seen in follow up, chart was reviewed. Patient had no acute overnight events but is refusing haldol. Patient is A&Ox3 and responding to questions cooperatively. Patient states her mood is alright this morning. Patient is sleeping and eating well. Patient admits to hearing voices that are threatening to attack her family. She also hears her family and children screaming. Patient admits to occasionally seeing "images," including stars and animals. To feel better, patient reports that she goes to the sink and washes her face and hair. Patient does not think anyone in the hospital is trying to hurt her but she states things were "suspicious" at [facility]. She states she doesn't think her family is trying to hurt her but she thinks her husbands family is trying to hurt her. Patient states she does not want to take Haldol because 5mg is too high of a dose. She reports that the reason she was brought to the hospital is due to the Haldol "knocking her out." She also thinks the Haldol may affect her 'child'. She reports that she has been pregnant for years with multiple fetuses. She says she feels them moving and that they talk to her occasionally, saying "I want my daddy." She reports "Haldol makes me bleed." Patient is agreeable to trying Risperidone instead of Haldol. Denies SI/HI/PI.  Care coordinated with daughter Edith [phone], who states that she concerned that her mother has been increasingly delusional and paranoid. States that she has labile affect at home, and refuses to see a psychiatrist. She states that her level of psychosis is affecting her ability to care for self, and family is having trouble taking care of her as well due to her noncompliance+ refusal.

## 2019-06-18 NOTE — DISCHARGE NOTE PROVIDER - NSFOLLOWUPCLINICS_GEN_ALL_ED_FT
[facility]  Endocrinology  [address] [place] [address]  Phone: [phone]  Fax:   Follow Up Time:

## 2019-06-18 NOTE — PHYSICAL THERAPY INITIAL EVALUATION ADULT - DISCHARGE DISPOSITION, PT EVAL
anticipated discharge to home with no skilled restorative physical therapy services upon discharge from [facility]. Please follow therapy for continued assessment.

## 2019-06-18 NOTE — DISCHARGE NOTE PROVIDER - HOSPITAL COURSE
69F h/o DM2 c/b neuropathy with recent transmetatarsal amputation, HTN adm 6/15 with metabolic encephalopathy 2/2 hypoglycemia in the setting of insulin overuse.        The patient's insulin dose was decreased.  Endo was consulted and agreed with management.  The patient will be discharged on a lower dose of insulin.  The patient was also noted to be acutely psychotic requiring further psychiatric care at [facility].  The patient was seen by Podiatry while inpatient, and recommendations were left.        The patient continues on linezolid, levofloxacin, and fluconazole for her toe OM, to be completed 6/24/19.        The patient was discharged to [facility].  She will follow up with her podiatrist Dr. Workman [phone]; [address], [place] [address].

## 2019-06-18 NOTE — PROGRESS NOTE ADULT - ASSESSMENT
69F DM2 with neuropathy s/p R TMA, several admissions for hypoglycemia, HbA1c 7.9% fair but inclusive of hypoglycemic events. 69F DM2 with neuropathy s/p R TMA, several admissions for hypoglycemia, HbA1c 7.9% fair but inclusive of hypoglycemic events.    1. DM 2  -Inpatient BG target 100-180 mg/dl  -BG within target in AM, hyperglycemic pre-lunch patient noted with orange juice at bedside which she reports she has been drinking between meals. Juice consumption discouraged except in situations of hypoglycemia.    -Would continue Lantus 11 units SQ qHS  -Continue Humalog 2 units SQ TID before meals (Hold if NPO/not eating meal)  -Continue Humalog low dose correctional scale SQ TID before meals and Humalog low dose bedtime correctional scale SQ qHS     Discharge Plan:  -Per care coordination notes, appears patient dispo is to [facility] due to psychiatric comorbidities. If discharged today, would recommend discharging on current basal insulin dose, Lantus 11 units SQ qHS and oral regimen of Metformin  mg PO BID x 1 week, then increase to 1000 mg PO BID.  -C-peptide is WDL which indicates that non insulin regimen should be acceptable, can consider discharging on only Metformin from [facility] if patient/family unable to do insulin injections.   -No sulfonylurea for discharge.  -Follow up appointments scheduled:  1. Diabetes Educator appointment with Madelyn Wolfe: Thursday 7/25/19 at 11:30 AM, [address], [address], [place] [address], [phone]  2. MD appointment with Dr. Zaragoza: Wednesday 9/11/19 at 2:00 PM, [address], [address], [place] [address], [phone]    Patient requested plan of care be reviewed with daughter Edith, spoke to her today 6/18 at [phone] 69F DM2 with neuropathy s/p R TMA, several admissions for hypoglycemia, HbA1c 7.9% fair but inclusive of hypoglycemic events.    1. DM 2  -Inpatient BG target 100-180 mg/dl  -BG within target in AM, hyperglycemic pre-lunch patient noted with orange juice at bedside which she reports she has been drinking between meals. Juice consumption discouraged except in situations of hypoglycemia.    -Would continue Lantus 11 units SQ qHS  -Continue Humalog 2 units SQ TID before meals (Hold if NPO/not eating meal)  -Continue Humalog low dose correctional scale SQ TID before meals and Humalog low dose bedtime correctional scale SQ qHS     Discharge Plan:  -Per care coordination notes, appears patient dispo is to [facility] due to psychiatric comorbidities. If discharged today, would recommend discharging on current basal insulin dose, Lantus 11 units SQ qHS and oral regimen of Metformin  mg PO BID x 1 week, then increase to 1000 mg PO BID.  -C-peptide is adequate which indicates that non insulin regimen could be acceptable, can consider discharging on only Metformin from [facility] if patient/family unable to do insulin injections.   -No sulfonylurea for discharge.  -Follow up appointments scheduled:  1. Diabetes Educator appointment with Madelyn Wolfe: Thursday 7/25/19 at 11:30 AM, [address], [address], [place] [facility] [address], [phone]  2. MD appointment with Dr. Zaragoza: Wednesday 9/11/19 at 2:00 PM, [address], [address], [place] [facility] [address], [phone]    Patient requested plan of care be reviewed with daughter Edith, spoke to her today 6/18 at [phone]

## 2019-06-18 NOTE — PHYSICAL THERAPY INITIAL EVALUATION ADULT - PLANNED THERAPY INTERVENTIONS, PT EVAL
strengthening/swiss ball techniques/transfer training/gait training/balance training/bed mobility training

## 2019-06-18 NOTE — PROGRESS NOTE ADULT - PROBLEM SELECTOR PLAN 6
- unknown baseline, per d/c summary was noted at [facility] as well  - last labs in system from 2015 with H/H 13.5/40.2  - TSH wnl, no s/s of bleeding  - ferritin high, iron low, likely from chronic disease.

## 2019-06-19 DIAGNOSIS — E11.52 TYPE 2 DIABETES MELLITUS WITH DIABETIC PERIPHERAL ANGIOPATHY WITH GANGRENE: ICD-10-CM

## 2019-06-19 DIAGNOSIS — F20.9 SCHIZOPHRENIA, UNSPECIFIED: ICD-10-CM

## 2019-06-19 DIAGNOSIS — I10 ESSENTIAL (PRIMARY) HYPERTENSION: ICD-10-CM

## 2019-06-19 LAB
GLUCOSE BLDC GLUCOMTR-MCNC: 123 MG/DL — HIGH (ref 70–99)
GLUCOSE BLDC GLUCOMTR-MCNC: 136 MG/DL — HIGH (ref 70–99)

## 2019-06-19 PROCEDURE — 99223 1ST HOSP IP/OBS HIGH 75: CPT

## 2019-06-19 PROCEDURE — 99232 SBSQ HOSP IP/OBS MODERATE 35: CPT

## 2019-06-19 RX ORDER — AMLODIPINE BESYLATE 2.5 MG/1
5 TABLET ORAL DAILY
Refills: 0 | Status: DISCONTINUED | OUTPATIENT
Start: 2019-06-19 | End: 2019-06-27

## 2019-06-19 RX ORDER — METFORMIN HYDROCHLORIDE 850 MG/1
500 TABLET ORAL
Refills: 0 | Status: COMPLETED | OUTPATIENT
Start: 2019-06-19 | End: 2019-06-24

## 2019-06-19 RX ORDER — METOPROLOL TARTRATE 50 MG
50 TABLET ORAL
Refills: 0 | Status: DISCONTINUED | OUTPATIENT
Start: 2019-06-19 | End: 2019-07-02

## 2019-06-19 RX ORDER — LISINOPRIL 2.5 MG/1
30 TABLET ORAL DAILY
Refills: 0 | Status: DISCONTINUED | OUTPATIENT
Start: 2019-06-19 | End: 2019-06-28

## 2019-06-19 RX ORDER — METFORMIN HYDROCHLORIDE 850 MG/1
1000 TABLET ORAL
Refills: 0 | Status: DISCONTINUED | OUTPATIENT
Start: 2019-06-25 | End: 2019-07-02

## 2019-06-19 RX ORDER — ACETAMINOPHEN 500 MG
650 TABLET ORAL ONCE
Refills: 0 | Status: COMPLETED | OUTPATIENT
Start: 2019-06-19 | End: 2019-06-19

## 2019-06-19 RX ADMIN — RISPERIDONE 0.5 MILLIGRAM(S): 4 TABLET ORAL at 10:01

## 2019-06-19 RX ADMIN — LINEZOLID 600 MILLIGRAM(S): 600 INJECTION, SOLUTION INTRAVENOUS at 09:59

## 2019-06-19 RX ADMIN — METFORMIN HYDROCHLORIDE 500 MILLIGRAM(S): 850 TABLET ORAL at 17:51

## 2019-06-19 RX ADMIN — LINEZOLID 600 MILLIGRAM(S): 600 INJECTION, SOLUTION INTRAVENOUS at 22:05

## 2019-06-19 RX ADMIN — RISPERIDONE 0.5 MILLIGRAM(S): 4 TABLET ORAL at 22:05

## 2019-06-19 RX ADMIN — FLUCONAZOLE 200 MILLIGRAM(S): 150 TABLET ORAL at 09:59

## 2019-06-19 RX ADMIN — INSULIN GLARGINE 11 UNIT(S): 100 INJECTION, SOLUTION SUBCUTANEOUS at 21:58

## 2019-06-19 RX ADMIN — Medication 50 MILLIGRAM(S): at 22:05

## 2019-06-19 RX ADMIN — Medication 0.5 MILLIGRAM(S): at 22:05

## 2019-06-19 NOTE — CONSULT NOTE ADULT - ASSESSMENT
69F Lady with h/o T2DM (on insulin), c/b neuropathy s/p recent transmetarsal amputation (6/6), schizophrenia and HTN admitted to [facility] for accidental insulin overdose and transferred to [facility] for acute psychosis.

## 2019-06-19 NOTE — CONSULT NOTE ADULT - PROBLEM SELECTOR RECOMMENDATION 9
Patient was seen by endo during hospitalization   For now will continue with lantus 11 units SQ qHS   Will monitor sugars while patient is staying at [facility] and will adjust insulin regiment accrodingly.   As per endo, C-peptide is adequate which indicates that non insulin regimen could be acceptable, can consider discharging on only Metformin from [facility] if patient/family unable to do insulin injections.

## 2019-06-19 NOTE — CONSULT NOTE ADULT - PROBLEM SELECTOR RECOMMENDATION 2
Continue amlodipine 5 mg daily  Lisinopril 30 mg daily  Metoprolol tartrate 50 mg BID  BP will be monitored and if needed medications will be adjusted accordingly

## 2019-06-19 NOTE — CONSULT NOTE ADULT - SUBJECTIVE AND OBJECTIVE BOX
Maxine Salmon is a 69 year old Lady with h/o T2DM (on insulin), c/b neuropathy s/p recent transmetarsal amputation (6/6) and HTN. The patient was initially admitted to [facility] because of an accidental insulin overdose. Patient does not have any new medical complaints and just states that the she thinks 5 mg of haldol is too much for her.    PAST MEDICAL & SURGICAL HISTORY:  Schizophrenia  Hypertension  Diabetes: insulin dependent  S/P appendectomy      Review of Systems:   CONSTITUTIONAL: No fever, weight loss, or fatigue  EYES: No eye pain, visual disturbances, or discharge  ENMT:  No difficulty hearing, tinnitus, vertigo; No sinus or throat pain  NECK: No pain or stiffness  RESPIRATORY: No cough, wheezing, chills or hemoptysis; No shortness of breath  CARDIOVASCULAR: No chest pain, palpitations, dizziness, or leg swelling  GASTROINTESTINAL: No abdominal or epigastric pain. No nausea, vomiting, or hematemesis; No diarrhea or constipation. No melena or hematochezia.  GENITOURINARY: No dysuria, frequency, hematuria, or incontinence  NEUROLOGICAL: No headaches, memory loss, loss of strength, numbness, or tremors  SKIN: No itching, burning, rashes, or lesions   LYMPH NODES: No enlarged glands  ENDOCRINE: No heat or cold intolerance; No hair loss  MUSCULOSKELETAL: No joint pain or swelling; No muscle, back, or extremity pain  HEME/LYMPH: No easy bruising, or bleeding gums  ALLERY AND IMMUNOLOGIC: No hives or eczema    Allergies    Sudafed (Hives)    Intolerances        Social History:     FAMILY HISTORY:  No pertinent family history in first degree relatives      MEDICATIONS  (STANDING):  amLODIPine   Tablet 5 milliGRAM(s) Oral daily  aspirin enteric coated 81 milliGRAM(s) Oral daily  benztropine 0.5 milliGRAM(s) Oral at bedtime  fluconAZOLE   Tablet 200 milliGRAM(s) Oral daily  insulin glargine Injectable (LANTUS) 11 Unit(s) SubCutaneous at bedtime  levoFLOXacin  Tablet 750 milliGRAM(s) Oral every 24 hours  linezolid    Tablet 600 milliGRAM(s) Oral every 12 hours  lisinopril 30 milliGRAM(s) Oral daily  metFORMIN Extended-Release 500 milliGRAM(s) Oral <User Schedule>  metoprolol tartrate 50 milliGRAM(s) Oral two times a day  risperiDONE   Tablet 0.5 milliGRAM(s) Oral two times a day    MEDICATIONS  (PRN):  haloperidol     Tablet 2.5 milliGRAM(s) Oral every 6 hours PRN agitation  haloperidol    Injectable 2.5 milliGRAM(s) IntraMuscular once PRN agitation      Vital Signs Last 24 Hrs  T(C): 36.7 (19 Jun 2019 06:35), Max: 36.7 (19 Jun 2019 06:35)  T(F): 98.1 (19 Jun 2019 06:35), Max: 98.1 (19 Jun 2019 06:35)  HR: --  BP: --  BP(mean): --  RR: 18 (18 Jun 2019 17:43) (18 - 18)  SpO2: --  CAPILLARY BLOOD GLUCOSE      POCT Blood Glucose.: 136 mg/dL (19 Jun 2019 07:41)  POCT Blood Glucose.: 295 mg/dL (18 Jun 2019 22:00)        PHYSICAL EXAM:  GENERAL: NAD, well-developed  HEAD:  Atraumatic, Normocephalic  EYES: EOMI, conjunctiva and sclera clear  NECK: Supple, No JVD  CHEST/LUNG: Clear to auscultation bilaterally; No wheeze  HEART: Regular rate and rhythm; No murmurs, rubs, or gallops  ABDOMEN: Soft, Nontender, Nondistended; Bowel sounds present  EXTREMITIES:  2+ Peripheral Pulses, No clubbing, cyanosis, or edema  NEUROLOGY: non-focal  SKIN: No rashes or lesions    LABS:                        9.4    5.53  )-----------( 446      ( 18 Jun 2019 05:07 )             31.0     06-18    133<L>  |  96<L>  |  10  ----------------------------<  177<H>  4.0   |  24  |  0.55    Ca    9.0      18 Jun 2019 05:07  Phos  3.3     06-18  Mg     1.7     06-18                EKG(personally reviewed):    RADIOLOGY & ADDITIONAL TESTS:    Imaging Personally Reviewed:    Consultant(s) Notes Reviewed:      Care Discussed with Consultants/Other Providers:

## 2019-06-20 LAB
BACTERIA BLD CULT: SIGNIFICANT CHANGE UP
BACTERIA BLD CULT: SIGNIFICANT CHANGE UP
GLUCOSE BLDC GLUCOMTR-MCNC: 113 MG/DL — HIGH (ref 70–99)
GLUCOSE BLDC GLUCOMTR-MCNC: 145 MG/DL — HIGH (ref 70–99)
GLUCOSE BLDC GLUCOMTR-MCNC: 89 MG/DL — SIGNIFICANT CHANGE UP (ref 70–99)

## 2019-06-20 PROCEDURE — 99232 SBSQ HOSP IP/OBS MODERATE 35: CPT

## 2019-06-20 RX ORDER — HALOPERIDOL DECANOATE 100 MG/ML
2.5 INJECTION INTRAMUSCULAR
Refills: 0 | Status: DISCONTINUED | OUTPATIENT
Start: 2019-06-20 | End: 2019-06-25

## 2019-06-20 RX ORDER — HALOPERIDOL DECANOATE 100 MG/ML
2.5 INJECTION INTRAMUSCULAR ONCE
Refills: 0 | Status: COMPLETED | OUTPATIENT
Start: 2019-06-20 | End: 2019-06-20

## 2019-06-20 RX ADMIN — METFORMIN HYDROCHLORIDE 500 MILLIGRAM(S): 850 TABLET ORAL at 17:17

## 2019-06-20 RX ADMIN — METFORMIN HYDROCHLORIDE 500 MILLIGRAM(S): 850 TABLET ORAL at 09:02

## 2019-06-20 RX ADMIN — Medication 650 MILLIGRAM(S): at 00:06

## 2019-06-20 RX ADMIN — Medication 650 MILLIGRAM(S): at 00:25

## 2019-06-20 RX ADMIN — HALOPERIDOL DECANOATE 2.5 MILLIGRAM(S): 100 INJECTION INTRAMUSCULAR at 21:05

## 2019-06-20 RX ADMIN — HALOPERIDOL DECANOATE 2.5 MILLIGRAM(S): 100 INJECTION INTRAMUSCULAR at 09:03

## 2019-06-20 RX ADMIN — FLUCONAZOLE 200 MILLIGRAM(S): 150 TABLET ORAL at 09:02

## 2019-06-20 RX ADMIN — INSULIN GLARGINE 11 UNIT(S): 100 INJECTION, SOLUTION SUBCUTANEOUS at 21:59

## 2019-06-20 RX ADMIN — LINEZOLID 600 MILLIGRAM(S): 600 INJECTION, SOLUTION INTRAVENOUS at 21:59

## 2019-06-20 RX ADMIN — Medication 0.5 MILLIGRAM(S): at 21:59

## 2019-06-20 RX ADMIN — LINEZOLID 600 MILLIGRAM(S): 600 INJECTION, SOLUTION INTRAVENOUS at 09:02

## 2019-06-20 RX ADMIN — Medication 50 MILLIGRAM(S): at 22:01

## 2019-06-21 LAB
GLUCOSE BLDC GLUCOMTR-MCNC: 118 MG/DL — HIGH (ref 70–99)
GLUCOSE BLDC GLUCOMTR-MCNC: 133 MG/DL — HIGH (ref 70–99)
GLUCOSE BLDC GLUCOMTR-MCNC: 173 MG/DL — HIGH (ref 70–99)
GLUCOSE BLDC GLUCOMTR-MCNC: 306 MG/DL — HIGH (ref 70–99)

## 2019-06-21 PROCEDURE — 99232 SBSQ HOSP IP/OBS MODERATE 35: CPT

## 2019-06-21 RX ADMIN — LINEZOLID 600 MILLIGRAM(S): 600 INJECTION, SOLUTION INTRAVENOUS at 10:07

## 2019-06-21 RX ADMIN — Medication 50 MILLIGRAM(S): at 21:12

## 2019-06-21 RX ADMIN — METFORMIN HYDROCHLORIDE 500 MILLIGRAM(S): 850 TABLET ORAL at 10:07

## 2019-06-21 RX ADMIN — METFORMIN HYDROCHLORIDE 500 MILLIGRAM(S): 850 TABLET ORAL at 17:23

## 2019-06-21 RX ADMIN — INSULIN GLARGINE 11 UNIT(S): 100 INJECTION, SOLUTION SUBCUTANEOUS at 21:44

## 2019-06-21 RX ADMIN — HALOPERIDOL DECANOATE 2.5 MILLIGRAM(S): 100 INJECTION INTRAMUSCULAR at 11:19

## 2019-06-21 RX ADMIN — HALOPERIDOL DECANOATE 2.5 MILLIGRAM(S): 100 INJECTION INTRAMUSCULAR at 10:06

## 2019-06-21 RX ADMIN — Medication 0.5 MILLIGRAM(S): at 21:12

## 2019-06-21 RX ADMIN — LINEZOLID 600 MILLIGRAM(S): 600 INJECTION, SOLUTION INTRAVENOUS at 21:13

## 2019-06-21 RX ADMIN — HALOPERIDOL DECANOATE 2.5 MILLIGRAM(S): 100 INJECTION INTRAMUSCULAR at 21:12

## 2019-06-21 RX ADMIN — AMLODIPINE BESYLATE 5 MILLIGRAM(S): 2.5 TABLET ORAL at 10:08

## 2019-06-21 RX ADMIN — Medication 81 MILLIGRAM(S): at 10:06

## 2019-06-22 LAB
GLUCOSE BLDC GLUCOMTR-MCNC: 129 MG/DL — HIGH (ref 70–99)
GLUCOSE BLDC GLUCOMTR-MCNC: 169 MG/DL — HIGH (ref 70–99)
GLUCOSE BLDC GLUCOMTR-MCNC: 175 MG/DL — HIGH (ref 70–99)

## 2019-06-22 PROCEDURE — 99232 SBSQ HOSP IP/OBS MODERATE 35: CPT

## 2019-06-22 RX ADMIN — INSULIN GLARGINE 11 UNIT(S): 100 INJECTION, SOLUTION SUBCUTANEOUS at 21:49

## 2019-06-22 RX ADMIN — HALOPERIDOL DECANOATE 2.5 MILLIGRAM(S): 100 INJECTION INTRAMUSCULAR at 21:38

## 2019-06-22 RX ADMIN — LINEZOLID 600 MILLIGRAM(S): 600 INJECTION, SOLUTION INTRAVENOUS at 10:59

## 2019-06-22 RX ADMIN — Medication 50 MILLIGRAM(S): at 10:59

## 2019-06-22 RX ADMIN — AMLODIPINE BESYLATE 5 MILLIGRAM(S): 2.5 TABLET ORAL at 10:59

## 2019-06-22 RX ADMIN — METFORMIN HYDROCHLORIDE 500 MILLIGRAM(S): 850 TABLET ORAL at 10:59

## 2019-06-22 RX ADMIN — FLUCONAZOLE 200 MILLIGRAM(S): 150 TABLET ORAL at 10:59

## 2019-06-22 RX ADMIN — Medication 81 MILLIGRAM(S): at 10:59

## 2019-06-22 RX ADMIN — LISINOPRIL 30 MILLIGRAM(S): 2.5 TABLET ORAL at 10:59

## 2019-06-22 RX ADMIN — HALOPERIDOL DECANOATE 2.5 MILLIGRAM(S): 100 INJECTION INTRAMUSCULAR at 10:59

## 2019-06-22 RX ADMIN — Medication 0.5 MILLIGRAM(S): at 21:38

## 2019-06-22 RX ADMIN — LINEZOLID 600 MILLIGRAM(S): 600 INJECTION, SOLUTION INTRAVENOUS at 20:50

## 2019-06-23 LAB
GLUCOSE BLDC GLUCOMTR-MCNC: 122 MG/DL — HIGH (ref 70–99)
GLUCOSE BLDC GLUCOMTR-MCNC: 99 MG/DL — SIGNIFICANT CHANGE UP (ref 70–99)

## 2019-06-23 PROCEDURE — 99232 SBSQ HOSP IP/OBS MODERATE 35: CPT

## 2019-06-23 RX ADMIN — LINEZOLID 600 MILLIGRAM(S): 600 INJECTION, SOLUTION INTRAVENOUS at 08:32

## 2019-06-23 RX ADMIN — INSULIN GLARGINE 11 UNIT(S): 100 INJECTION, SOLUTION SUBCUTANEOUS at 20:34

## 2019-06-23 RX ADMIN — Medication 81 MILLIGRAM(S): at 08:32

## 2019-06-23 RX ADMIN — Medication 50 MILLIGRAM(S): at 08:32

## 2019-06-23 RX ADMIN — AMLODIPINE BESYLATE 5 MILLIGRAM(S): 2.5 TABLET ORAL at 08:32

## 2019-06-23 RX ADMIN — FLUCONAZOLE 200 MILLIGRAM(S): 150 TABLET ORAL at 08:32

## 2019-06-23 RX ADMIN — LISINOPRIL 30 MILLIGRAM(S): 2.5 TABLET ORAL at 08:32

## 2019-06-23 RX ADMIN — Medication 0.5 MILLIGRAM(S): at 22:29

## 2019-06-23 RX ADMIN — METFORMIN HYDROCHLORIDE 500 MILLIGRAM(S): 850 TABLET ORAL at 08:32

## 2019-06-23 RX ADMIN — HALOPERIDOL DECANOATE 2.5 MILLIGRAM(S): 100 INJECTION INTRAMUSCULAR at 08:32

## 2019-06-23 RX ADMIN — Medication 50 MILLIGRAM(S): at 22:35

## 2019-06-23 RX ADMIN — HALOPERIDOL DECANOATE 2.5 MILLIGRAM(S): 100 INJECTION INTRAMUSCULAR at 22:29

## 2019-06-23 RX ADMIN — LINEZOLID 600 MILLIGRAM(S): 600 INJECTION, SOLUTION INTRAVENOUS at 22:30

## 2019-06-23 RX ADMIN — METFORMIN HYDROCHLORIDE 500 MILLIGRAM(S): 850 TABLET ORAL at 16:32

## 2019-06-24 LAB
GLUCOSE BLDC GLUCOMTR-MCNC: 114 MG/DL — HIGH (ref 70–99)
GLUCOSE BLDC GLUCOMTR-MCNC: 140 MG/DL — HIGH (ref 70–99)

## 2019-06-24 PROCEDURE — 99232 SBSQ HOSP IP/OBS MODERATE 35: CPT

## 2019-06-24 RX ADMIN — Medication 81 MILLIGRAM(S): at 09:47

## 2019-06-24 RX ADMIN — METFORMIN HYDROCHLORIDE 500 MILLIGRAM(S): 850 TABLET ORAL at 09:47

## 2019-06-24 RX ADMIN — FLUCONAZOLE 200 MILLIGRAM(S): 150 TABLET ORAL at 09:47

## 2019-06-24 RX ADMIN — METFORMIN HYDROCHLORIDE 500 MILLIGRAM(S): 850 TABLET ORAL at 16:09

## 2019-06-24 RX ADMIN — METFORMIN HYDROCHLORIDE 1000 MILLIGRAM(S): 850 TABLET ORAL at 16:08

## 2019-06-24 RX ADMIN — HALOPERIDOL DECANOATE 2.5 MILLIGRAM(S): 100 INJECTION INTRAMUSCULAR at 09:47

## 2019-06-24 RX ADMIN — LINEZOLID 600 MILLIGRAM(S): 600 INJECTION, SOLUTION INTRAVENOUS at 09:47

## 2019-06-25 LAB
GLUCOSE BLDC GLUCOMTR-MCNC: 105 MG/DL — HIGH (ref 70–99)
GLUCOSE BLDC GLUCOMTR-MCNC: 124 MG/DL — HIGH (ref 70–99)
GLUCOSE BLDC GLUCOMTR-MCNC: 132 MG/DL — HIGH (ref 70–99)
GLUCOSE BLDC GLUCOMTR-MCNC: 133 MG/DL — HIGH (ref 70–99)

## 2019-06-25 PROCEDURE — 99232 SBSQ HOSP IP/OBS MODERATE 35: CPT

## 2019-06-25 RX ORDER — HALOPERIDOL DECANOATE 100 MG/ML
2.5 INJECTION INTRAMUSCULAR
Refills: 0 | Status: DISCONTINUED | OUTPATIENT
Start: 2019-06-25 | End: 2019-07-01

## 2019-06-25 RX ADMIN — HALOPERIDOL DECANOATE 2.5 MILLIGRAM(S): 100 INJECTION INTRAMUSCULAR at 09:15

## 2019-06-25 RX ADMIN — Medication 81 MILLIGRAM(S): at 09:16

## 2019-06-25 RX ADMIN — Medication 1 DROP(S): at 16:58

## 2019-06-25 RX ADMIN — HALOPERIDOL DECANOATE 2.5 MILLIGRAM(S): 100 INJECTION INTRAMUSCULAR at 16:38

## 2019-06-25 RX ADMIN — METFORMIN HYDROCHLORIDE 1000 MILLIGRAM(S): 850 TABLET ORAL at 09:16

## 2019-06-25 RX ADMIN — Medication 0.5 MILLIGRAM(S): at 21:41

## 2019-06-25 RX ADMIN — Medication 50 MILLIGRAM(S): at 21:41

## 2019-06-25 RX ADMIN — METFORMIN HYDROCHLORIDE 1000 MILLIGRAM(S): 850 TABLET ORAL at 16:39

## 2019-06-25 RX ADMIN — INSULIN GLARGINE 11 UNIT(S): 100 INJECTION, SOLUTION SUBCUTANEOUS at 21:40

## 2019-06-26 LAB
GLUCOSE BLDC GLUCOMTR-MCNC: 111 MG/DL — HIGH (ref 70–99)
GLUCOSE BLDC GLUCOMTR-MCNC: 126 MG/DL — HIGH (ref 70–99)
GLUCOSE BLDC GLUCOMTR-MCNC: 133 MG/DL — HIGH (ref 70–99)
GLUCOSE BLDC GLUCOMTR-MCNC: 92 MG/DL — SIGNIFICANT CHANGE UP (ref 70–99)

## 2019-06-26 PROCEDURE — 99232 SBSQ HOSP IP/OBS MODERATE 35: CPT

## 2019-06-26 RX ORDER — HALOPERIDOL DECANOATE 100 MG/ML
100 INJECTION INTRAMUSCULAR ONCE
Refills: 0 | Status: COMPLETED | OUTPATIENT
Start: 2019-06-26 | End: 2019-06-26

## 2019-06-26 RX ADMIN — METFORMIN HYDROCHLORIDE 1000 MILLIGRAM(S): 850 TABLET ORAL at 09:02

## 2019-06-26 RX ADMIN — METFORMIN HYDROCHLORIDE 1000 MILLIGRAM(S): 850 TABLET ORAL at 18:07

## 2019-06-26 RX ADMIN — AMLODIPINE BESYLATE 5 MILLIGRAM(S): 2.5 TABLET ORAL at 09:01

## 2019-06-26 RX ADMIN — Medication 81 MILLIGRAM(S): at 09:01

## 2019-06-26 RX ADMIN — HALOPERIDOL DECANOATE 2.5 MILLIGRAM(S): 100 INJECTION INTRAMUSCULAR at 09:01

## 2019-06-26 RX ADMIN — LISINOPRIL 30 MILLIGRAM(S): 2.5 TABLET ORAL at 09:02

## 2019-06-26 RX ADMIN — INSULIN GLARGINE 11 UNIT(S): 100 INJECTION, SOLUTION SUBCUTANEOUS at 21:42

## 2019-06-26 RX ADMIN — Medication 50 MILLIGRAM(S): at 09:02

## 2019-06-26 RX ADMIN — HALOPERIDOL DECANOATE 100 MILLIGRAM(S): 100 INJECTION INTRAMUSCULAR at 11:40

## 2019-06-26 RX ADMIN — HALOPERIDOL DECANOATE 2.5 MILLIGRAM(S): 100 INJECTION INTRAMUSCULAR at 18:07

## 2019-06-26 RX ADMIN — Medication 50 MILLIGRAM(S): at 20:56

## 2019-06-26 RX ADMIN — Medication 0.5 MILLIGRAM(S): at 20:57

## 2019-06-27 LAB
GLUCOSE BLDC GLUCOMTR-MCNC: 112 MG/DL — HIGH (ref 70–99)
GLUCOSE BLDC GLUCOMTR-MCNC: 170 MG/DL — HIGH (ref 70–99)
GLUCOSE BLDC GLUCOMTR-MCNC: 177 MG/DL — HIGH (ref 70–99)

## 2019-06-27 PROCEDURE — 99233 SBSQ HOSP IP/OBS HIGH 50: CPT

## 2019-06-27 PROCEDURE — 99232 SBSQ HOSP IP/OBS MODERATE 35: CPT

## 2019-06-27 RX ORDER — HALOPERIDOL DECANOATE 100 MG/ML
2.5 INJECTION INTRAMUSCULAR ONCE
Refills: 0 | Status: COMPLETED | OUTPATIENT
Start: 2019-06-27 | End: 2019-06-27

## 2019-06-27 RX ORDER — INSULIN GLARGINE 100 [IU]/ML
5 INJECTION, SOLUTION SUBCUTANEOUS AT BEDTIME
Refills: 0 | Status: DISCONTINUED | OUTPATIENT
Start: 2019-06-27 | End: 2019-06-28

## 2019-06-27 RX ADMIN — Medication 0.5 MILLIGRAM(S): at 22:17

## 2019-06-27 RX ADMIN — INSULIN GLARGINE 5 UNIT(S): 100 INJECTION, SOLUTION SUBCUTANEOUS at 22:17

## 2019-06-27 RX ADMIN — Medication 1 DROP(S): at 22:50

## 2019-06-27 RX ADMIN — HALOPERIDOL DECANOATE 2.5 MILLIGRAM(S): 100 INJECTION INTRAMUSCULAR at 17:43

## 2019-06-27 RX ADMIN — METFORMIN HYDROCHLORIDE 1000 MILLIGRAM(S): 850 TABLET ORAL at 17:44

## 2019-06-27 RX ADMIN — Medication 50 MILLIGRAM(S): at 22:17

## 2019-06-27 RX ADMIN — Medication 1 DROP(S): at 13:12

## 2019-06-27 RX ADMIN — HALOPERIDOL DECANOATE 2.5 MILLIGRAM(S): 100 INJECTION INTRAMUSCULAR at 12:10

## 2019-06-27 NOTE — PROGRESS NOTE ADULT - SUBJECTIVE AND OBJECTIVE BOX
CC/Reason for Consult: f/u DM    SUBJECTIVE / OVERNIGHT EVENTS: Pt denies complaints, wants to go home    MEDICATIONS  (STANDING):  aspirin enteric coated 81 milliGRAM(s) Oral daily  benztropine 0.5 milliGRAM(s) Oral at bedtime  haloperidol     Tablet 2.5 milliGRAM(s) Oral <User Schedule>  insulin glargine Injectable (LANTUS) 5 Unit(s) SubCutaneous at bedtime  lisinopril 30 milliGRAM(s) Oral daily  metFORMIN Extended-Release 1000 milliGRAM(s) Oral <User Schedule>  metoprolol tartrate 50 milliGRAM(s) Oral two times a day    MEDICATIONS  (PRN):  artificial  tears Solution 1 Drop(s) Both EYES every 3 hours PRN dry eyes  haloperidol     Tablet 2.5 milliGRAM(s) Oral every 6 hours PRN agitation  haloperidol    Injectable 2.5 milliGRAM(s) IntraMuscular once PRN agitation      Vital Signs Last 24 Hrs  T(C): 36.4 (26 Jun 2019 15:38), Max: 36.4 (26 Jun 2019 15:38)  T(F): 97.5 (26 Jun 2019 15:38), Max: 97.5 (26 Jun 2019 15:38)  HR: 89  BP: 130/75  BP(mean): --  RR: 15  SpO2: --  CAPILLARY BLOOD GLUCOSE      POCT Blood Glucose.: 112 mg/dL (27 Jun 2019 07:48)  POCT Blood Glucose.: 126 mg/dL (26 Jun 2019 21:11)  POCT Blood Glucose.: 111 mg/dL (26 Jun 2019 16:29)  POCT Blood Glucose.: 133 mg/dL (26 Jun 2019 11:28)        PHYSICAL EXAM:  GENERAL: NAD, well-developed  HEAD:  Atraumatic, Normocephalic  EYES: EOMI, conjunctiva and sclera clear  NECK: Supple, No JVD  CHEST/LUNG: Clear to auscultation bilaterally; No wheeze  HEART: Regular rate and rhythm; No murmurs, rubs, or gallops  ABDOMEN: Soft, Nontender, Nondistended; Bowel sounds present  EXTREMITIES:  2+ Peripheral Pulses, No clubbing, cyanosis, or edema right post op foot wound CDI  NEUROLOGY: non-focal  SKIN: No rashes or lesions    LABS:      Reviewed in [place]        Care Discussed with Consultants/Other Providers: Dr Adler

## 2019-06-27 NOTE — PROGRESS NOTE ADULT - ASSESSMENT
69F DM2 HTN schizophrenia admitted to [facility] with hypoglycemia 69F DM2 HTN s/p R TMA at [facility] schizophrenia admitted to [facility] with hypoglycemia 6/15    Plan:  DM2: Safest for patient to be discharged without insulin if possible; BG hav ebeen very well controlled.  Will taper lantus and hopefully patient's DMcan be maintained on an oral regimen.  Would likely benefit from statin, will check lipid profile.    HTN: BP have been soft , will discontinue amlodipine and monitor BP.  Continue lisinopril and metoprolol.    TMA: Follow up with outpt podiatrist    Schizophrenia: Management per primary team

## 2019-06-28 LAB
GLUCOSE BLDC GLUCOMTR-MCNC: 119 MG/DL — HIGH (ref 70–99)
GLUCOSE BLDC GLUCOMTR-MCNC: 150 MG/DL — HIGH (ref 70–99)

## 2019-06-28 PROCEDURE — 99232 SBSQ HOSP IP/OBS MODERATE 35: CPT

## 2019-06-28 RX ORDER — INSULIN GLARGINE 100 [IU]/ML
2 INJECTION, SOLUTION SUBCUTANEOUS AT BEDTIME
Refills: 0 | Status: DISCONTINUED | OUTPATIENT
Start: 2019-06-30 | End: 2019-07-01

## 2019-06-28 RX ORDER — LISINOPRIL 2.5 MG/1
20 TABLET ORAL DAILY
Refills: 0 | Status: DISCONTINUED | OUTPATIENT
Start: 2019-06-29 | End: 2019-07-01

## 2019-06-28 RX ORDER — INSULIN GLARGINE 100 [IU]/ML
5 INJECTION, SOLUTION SUBCUTANEOUS AT BEDTIME
Refills: 0 | Status: COMPLETED | OUTPATIENT
Start: 2019-06-28 | End: 2019-06-29

## 2019-06-28 RX ADMIN — INSULIN GLARGINE 5 UNIT(S): 100 INJECTION, SOLUTION SUBCUTANEOUS at 22:04

## 2019-06-28 RX ADMIN — Medication 50 MILLIGRAM(S): at 22:05

## 2019-06-28 RX ADMIN — HALOPERIDOL DECANOATE 2.5 MILLIGRAM(S): 100 INJECTION INTRAMUSCULAR at 08:50

## 2019-06-28 RX ADMIN — METFORMIN HYDROCHLORIDE 1000 MILLIGRAM(S): 850 TABLET ORAL at 08:50

## 2019-06-28 RX ADMIN — HALOPERIDOL DECANOATE 2.5 MILLIGRAM(S): 100 INJECTION INTRAMUSCULAR at 17:50

## 2019-06-28 RX ADMIN — Medication 0.5 MILLIGRAM(S): at 22:06

## 2019-06-28 RX ADMIN — Medication 81 MILLIGRAM(S): at 08:50

## 2019-06-28 RX ADMIN — METFORMIN HYDROCHLORIDE 1000 MILLIGRAM(S): 850 TABLET ORAL at 17:50

## 2019-06-29 PROCEDURE — 99232 SBSQ HOSP IP/OBS MODERATE 35: CPT

## 2019-06-29 RX ADMIN — HALOPERIDOL DECANOATE 2.5 MILLIGRAM(S): 100 INJECTION INTRAMUSCULAR at 09:42

## 2019-06-29 RX ADMIN — METFORMIN HYDROCHLORIDE 1000 MILLIGRAM(S): 850 TABLET ORAL at 08:43

## 2019-06-29 RX ADMIN — Medication 50 MILLIGRAM(S): at 21:36

## 2019-06-29 RX ADMIN — Medication 0.5 MILLIGRAM(S): at 21:36

## 2019-06-29 RX ADMIN — INSULIN GLARGINE 5 UNIT(S): 100 INJECTION, SOLUTION SUBCUTANEOUS at 22:46

## 2019-06-29 RX ADMIN — METFORMIN HYDROCHLORIDE 1000 MILLIGRAM(S): 850 TABLET ORAL at 17:56

## 2019-06-29 RX ADMIN — HALOPERIDOL DECANOATE 2.5 MILLIGRAM(S): 100 INJECTION INTRAMUSCULAR at 17:56

## 2019-06-29 RX ADMIN — Medication 81 MILLIGRAM(S): at 09:42

## 2019-06-30 LAB — GLUCOSE BLDC GLUCOMTR-MCNC: 147 MG/DL — HIGH (ref 70–99)

## 2019-06-30 PROCEDURE — 99232 SBSQ HOSP IP/OBS MODERATE 35: CPT

## 2019-06-30 RX ADMIN — INSULIN GLARGINE 2 UNIT(S): 100 INJECTION, SOLUTION SUBCUTANEOUS at 22:20

## 2019-06-30 RX ADMIN — METFORMIN HYDROCHLORIDE 1000 MILLIGRAM(S): 850 TABLET ORAL at 17:29

## 2019-06-30 RX ADMIN — Medication 1 DROP(S): at 17:04

## 2019-06-30 RX ADMIN — Medication 0.5 MILLIGRAM(S): at 22:48

## 2019-06-30 RX ADMIN — HALOPERIDOL DECANOATE 2.5 MILLIGRAM(S): 100 INJECTION INTRAMUSCULAR at 09:24

## 2019-06-30 RX ADMIN — METFORMIN HYDROCHLORIDE 1000 MILLIGRAM(S): 850 TABLET ORAL at 09:43

## 2019-06-30 RX ADMIN — Medication 50 MILLIGRAM(S): at 09:24

## 2019-06-30 RX ADMIN — LISINOPRIL 20 MILLIGRAM(S): 2.5 TABLET ORAL at 09:24

## 2019-06-30 RX ADMIN — Medication 50 MILLIGRAM(S): at 22:48

## 2019-06-30 RX ADMIN — HALOPERIDOL DECANOATE 2.5 MILLIGRAM(S): 100 INJECTION INTRAMUSCULAR at 17:29

## 2019-06-30 RX ADMIN — Medication 81 MILLIGRAM(S): at 09:24

## 2019-07-01 LAB
GLUCOSE BLDC GLUCOMTR-MCNC: 135 MG/DL — HIGH (ref 70–99)
GLUCOSE BLDC GLUCOMTR-MCNC: 173 MG/DL — HIGH (ref 70–99)
GLUCOSE BLDC GLUCOMTR-MCNC: 178 MG/DL — HIGH (ref 70–99)

## 2019-07-01 PROCEDURE — 99232 SBSQ HOSP IP/OBS MODERATE 35: CPT

## 2019-07-01 PROCEDURE — 99233 SBSQ HOSP IP/OBS HIGH 50: CPT

## 2019-07-01 RX ORDER — METFORMIN HYDROCHLORIDE 850 MG/1
1 TABLET ORAL
Qty: 60 | Refills: 0
Start: 2019-07-01 | End: 2019-07-30

## 2019-07-01 RX ORDER — HALOPERIDOL DECANOATE 100 MG/ML
2.5 INJECTION INTRAMUSCULAR
Refills: 0 | Status: COMPLETED | OUTPATIENT
Start: 2019-07-01 | End: 2019-07-01

## 2019-07-01 RX ORDER — AMLODIPINE BESYLATE 2.5 MG/1
1 TABLET ORAL
Qty: 0 | Refills: 0 | DISCHARGE

## 2019-07-01 RX ORDER — LISINOPRIL 2.5 MG/1
1 TABLET ORAL
Qty: 0 | Refills: 0 | DISCHARGE

## 2019-07-01 RX ORDER — BENZTROPINE MESYLATE 1 MG
1 TABLET ORAL
Qty: 0 | Refills: 0 | DISCHARGE

## 2019-07-01 RX ORDER — HALOPERIDOL DECANOATE 100 MG/ML
5 INJECTION INTRAMUSCULAR DAILY
Refills: 0 | Status: DISCONTINUED | OUTPATIENT
Start: 2019-07-02 | End: 2019-07-02

## 2019-07-01 RX ORDER — METOPROLOL TARTRATE 50 MG
1 TABLET ORAL
Qty: 60 | Refills: 0
Start: 2019-07-01 | End: 2019-07-30

## 2019-07-01 RX ORDER — METOPROLOL TARTRATE 50 MG
1 TABLET ORAL
Qty: 0 | Refills: 0 | DISCHARGE

## 2019-07-01 RX ORDER — LISINOPRIL 2.5 MG/1
10 TABLET ORAL DAILY
Refills: 0 | Status: DISCONTINUED | OUTPATIENT
Start: 2019-07-02 | End: 2019-07-02

## 2019-07-01 RX ORDER — BENZTROPINE MESYLATE 1 MG
1 TABLET ORAL
Qty: 30 | Refills: 0
Start: 2019-07-01 | End: 2019-07-30

## 2019-07-01 RX ORDER — ASPIRIN/CALCIUM CARB/MAGNESIUM 324 MG
1 TABLET ORAL
Qty: 30 | Refills: 0
Start: 2019-07-01 | End: 2019-07-30

## 2019-07-01 RX ORDER — LISINOPRIL 2.5 MG/1
1 TABLET ORAL
Qty: 30 | Refills: 0
Start: 2019-07-01 | End: 2019-07-30

## 2019-07-01 RX ORDER — HALOPERIDOL DECANOATE 100 MG/ML
1 INJECTION INTRAMUSCULAR
Qty: 0 | Refills: 0 | DISCHARGE

## 2019-07-01 RX ORDER — METFORMIN HYDROCHLORIDE 850 MG/1
1 TABLET ORAL
Qty: 0 | Refills: 0 | DISCHARGE

## 2019-07-01 RX ORDER — INSULIN GLARGINE 100 [IU]/ML
11 INJECTION, SOLUTION SUBCUTANEOUS
Qty: 0 | Refills: 0 | DISCHARGE

## 2019-07-01 RX ORDER — ASPIRIN/CALCIUM CARB/MAGNESIUM 324 MG
1 TABLET ORAL
Qty: 0 | Refills: 0 | DISCHARGE

## 2019-07-01 RX ORDER — HALOPERIDOL DECANOATE 100 MG/ML
1 INJECTION INTRAMUSCULAR
Qty: 30 | Refills: 0
Start: 2019-07-01 | End: 2019-07-30

## 2019-07-01 RX ADMIN — HALOPERIDOL DECANOATE 2.5 MILLIGRAM(S): 100 INJECTION INTRAMUSCULAR at 17:01

## 2019-07-01 RX ADMIN — Medication 81 MILLIGRAM(S): at 10:24

## 2019-07-01 RX ADMIN — METFORMIN HYDROCHLORIDE 1000 MILLIGRAM(S): 850 TABLET ORAL at 10:24

## 2019-07-01 RX ADMIN — Medication 50 MILLIGRAM(S): at 10:24

## 2019-07-01 RX ADMIN — HALOPERIDOL DECANOATE 2.5 MILLIGRAM(S): 100 INJECTION INTRAMUSCULAR at 10:24

## 2019-07-01 RX ADMIN — Medication 50 MILLIGRAM(S): at 22:11

## 2019-07-01 RX ADMIN — Medication 0.5 MILLIGRAM(S): at 22:11

## 2019-07-01 RX ADMIN — METFORMIN HYDROCHLORIDE 1000 MILLIGRAM(S): 850 TABLET ORAL at 17:01

## 2019-07-01 RX ADMIN — LISINOPRIL 20 MILLIGRAM(S): 2.5 TABLET ORAL at 10:24

## 2019-07-01 NOTE — PROGRESS NOTE ADULT - ASSESSMENT
69F DM2 HTN s/p R TMA at [facility] schizophrenia admitted to [facility] with hypoglycemia 6/15    Plan:  DM2: BG control adequate as lantus tapered off.  Will discontinue and patient may be discharged on oral agents only.  F/u with PMD for further management as well as evaluation for statin.    HTN: BP stable off amlodipine.  Continue lisinopril and metoprolol.    TMA: Follow up with outpt podiatrist    Schizophrenia: Management per primary team

## 2019-07-01 NOTE — PROGRESS NOTE ADULT - SUBJECTIVE AND OBJECTIVE BOX
CC/Reason for Consult: DM    SUBJECTIVE / OVERNIGHT EVENTS: Pt denies complaints, wants to go home    MEDICATIONS  (STANDING):  aspirin enteric coated 81 milliGRAM(s) Oral daily  benztropine 0.5 milliGRAM(s) Oral at bedtime  haloperidol     Tablet 2.5 milliGRAM(s) Oral <User Schedule>  metFORMIN Extended-Release 1000 milliGRAM(s) Oral <User Schedule>  metoprolol tartrate 50 milliGRAM(s) Oral two times a day    MEDICATIONS  (PRN):  artificial  tears Solution 1 Drop(s) Both EYES every 3 hours PRN dry eyes  haloperidol     Tablet 2.5 milliGRAM(s) Oral every 6 hours PRN agitation  haloperidol    Injectable 2.5 milliGRAM(s) IntraMuscular once PRN agitation      Vital Signs Last 24 Hrs  T(C): 36.4 (01 Jul 2019 08:13), Max: 36.6 (30 Jun 2019 15:50)  T(F): 97.6 (01 Jul 2019 08:13), Max: 97.8 (30 Jun 2019 15:50)  HR: 93 (30 Jun 2019 20:49) (93 - 93)  BP: 122/70 (30 Jun 2019 20:49) (122/70 - 122/70)  BP(mean): --  RR: 18 (01 Jul 2019 08:13) (18 - 18)  SpO2: --  CAPILLARY BLOOD GLUCOSE      POCT Blood Glucose.: 178 mg/dL (01 Jul 2019 12:01)  POCT Blood Glucose.: 147 mg/dL (30 Jun 2019 21:27)        PHYSICAL EXAM:  GENERAL: NAD, well-developed  HEAD:  Atraumatic, Normocephalic  EYES: EOMI, conjunctiva and sclera clear  NECK: Supple, No JVD  CHEST/LUNG: Clear to auscultation bilaterally; No wheeze  HEART: Regular rate and rhythm; No murmurs, rubs, or gallops  ABDOMEN: Soft, Nontender, Nondistended; Bowel sounds present  EXTREMITIES:  2+ Peripheral Pulses, No clubbing, cyanosis, or edema right post op foot wound CDI  NEUROLOGY: non-focal  SKIN: No rashes or lesions      Care Discussed with Consultants/Other Providers: Dr Adler

## 2019-07-02 VITALS — TEMPERATURE: 98 F

## 2019-07-02 LAB — GLUCOSE BLDC GLUCOMTR-MCNC: 166 MG/DL — HIGH (ref 70–99)

## 2019-07-02 PROCEDURE — 99238 HOSP IP/OBS DSCHRG MGMT 30/<: CPT

## 2019-07-02 RX ORDER — METFORMIN HYDROCHLORIDE 850 MG/1
2 TABLET ORAL
Qty: 120 | Refills: 0
Start: 2019-07-02 | End: 2019-07-31

## 2019-07-02 RX ADMIN — Medication 81 MILLIGRAM(S): at 09:13

## 2019-07-02 RX ADMIN — HALOPERIDOL DECANOATE 5 MILLIGRAM(S): 100 INJECTION INTRAMUSCULAR at 09:13

## 2019-07-02 RX ADMIN — Medication 50 MILLIGRAM(S): at 09:13

## 2019-07-02 RX ADMIN — METFORMIN HYDROCHLORIDE 1000 MILLIGRAM(S): 850 TABLET ORAL at 09:13

## 2019-07-02 RX ADMIN — LISINOPRIL 10 MILLIGRAM(S): 2.5 TABLET ORAL at 09:13

## 2019-07-26 NOTE — PATIENT PROFILE ADULT - NSPROGENOTHERPROVIDER_GEN_A_NUR
Since[facility] Ben[facility])  Internal Medicine  [address] [place] [address]  Phone: [phone]  Fax: [phone]  Follow Up Time: 1-3 days none

## 2019-08-30 ENCOUNTER — APPOINTMENT (OUTPATIENT)
Dept: ENDOCRINOLOGY | Facility: CLINIC | Age: 69
End: 2019-08-30

## 2019-09-11 ENCOUNTER — APPOINTMENT (OUTPATIENT)
Dept: ENDOCRINOLOGY | Facility: CLINIC | Age: 69
End: 2019-09-11

## 2020-08-04 NOTE — PROGRESS NOTE ADULT - PROBLEM SELECTOR PROBLEM 1
Metabolic encephalopathy
Metabolic encephalopathy
Type 2 diabetes mellitus with other circulatory complication, with long-term current use of insulin
Metabolic encephalopathy
thoughts to relapse on substances

## 2023-04-04 NOTE — PHYSICAL THERAPY INITIAL EVALUATION ADULT - LEVEL OF INDEPENDENCE: SUPINE/SIT, REHAB EVAL
supervision Sarecycline Counseling: Patient advised regarding possible photosensitivity and discoloration of the teeth, skin, lips, tongue and gums.  Patient instructed to avoid sunlight, if possible.  When exposed to sunlight, patients should wear protective clothing, sunglasses, and sunscreen.  The patient was instructed to call the office immediately if the following severe adverse effects occur:  hearing changes, easy bruising/bleeding, severe headache, or vision changes.  The patient verbalized understanding of the proper use and possible adverse effects of sarecycline.  All of the patient's questions and concerns were addressed.